# Patient Record
Sex: FEMALE | Race: WHITE | NOT HISPANIC OR LATINO | Employment: PART TIME
[De-identification: names, ages, dates, MRNs, and addresses within clinical notes are randomized per-mention and may not be internally consistent; named-entity substitution may affect disease eponyms.]

---

## 2017-01-24 ENCOUNTER — CHARTING TRANS (OUTPATIENT)
Dept: OTHER | Age: 22
End: 2017-01-24

## 2017-01-24 ENCOUNTER — MYAURORA ACCOUNT LINK (OUTPATIENT)
Dept: OTHER | Age: 22
End: 2017-01-24

## 2017-02-24 ENCOUNTER — LAB SERVICES (OUTPATIENT)
Dept: OTHER | Age: 22
End: 2017-02-24

## 2017-02-24 ENCOUNTER — CHARTING TRANS (OUTPATIENT)
Dept: OTHER | Age: 22
End: 2017-02-24

## 2017-02-24 ENCOUNTER — MYAURORA ACCOUNT LINK (OUTPATIENT)
Dept: OTHER | Age: 22
End: 2017-02-24

## 2017-02-24 LAB — MONOCLONAL PREGNANCY: NORMAL

## 2017-02-28 ENCOUNTER — CHARTING TRANS (OUTPATIENT)
Dept: OTHER | Age: 22
End: 2017-02-28

## 2017-02-28 LAB
APPEARANCE UR: CLEAR
BACTERIA #/AREA URNS HPF: ABNORMAL /HPF
BILIRUB UR QL: NEGATIVE
C TRACH RRNA SPEC QL NAA+PROBE: NEGATIVE
COLOR UR: ABNORMAL
GLUCOSE UR-MCNC: NEGATIVE MG/DL
HYALINE CASTS #/AREA URNS LPF: ABNORMAL /LPF (ref 0–5)
KETONES UR-MCNC: NEGATIVE MG/DL
MUCOUS THREADS URNS QL MICRO: PRESENT
N GONORRHOEA RRNA SPEC QL NAA+PROBE: NEGATIVE
NITRITE UR QL: NEGATIVE
PH UR: 6 UNITS (ref 5–7)
PROT UR QL: NEGATIVE MG/DL
RBC #/AREA URNS HPF: ABNORMAL /HPF (ref 0–3)
RBC-URINE: ABNORMAL
SP GR UR: 1.01 (ref 1–1.03)
SPECIMEN SOURCE: ABNORMAL
SPECIMEN SOURCE: NORMAL
SQUAMOUS #/AREA URNS HPF: ABNORMAL /HPF (ref 0–5)
UROBILINOGEN UR QL: 0.2 MG/DL (ref 0–1)
WBC #/AREA URNS HPF: ABNORMAL /HPF (ref 0–5)
WBC-URINE: NEGATIVE

## 2017-11-30 ENCOUNTER — CHARTING TRANS (OUTPATIENT)
Dept: OTHER | Age: 22
End: 2017-11-30

## 2017-11-30 ENCOUNTER — LAB SERVICES (OUTPATIENT)
Dept: OTHER | Age: 22
End: 2017-11-30

## 2017-11-30 ASSESSMENT — PAIN SCALES - GENERAL: PAINLEVEL_OUTOF10: 0

## 2017-12-01 ENCOUNTER — CHARTING TRANS (OUTPATIENT)
Dept: OTHER | Age: 22
End: 2017-12-01

## 2017-12-01 LAB
ALBUMIN SERPL-MCNC: 3.9 G/DL (ref 3.6–5.1)
ALBUMIN/GLOB SERPL: 1.1 (ref 1–2.4)
ALP SERPL-CCNC: 55 UNITS/L (ref 45–117)
ALT SERPL-CCNC: 17 UNITS/L
ANION GAP SERPL CALC-SCNC: 15 MMOL/L (ref 10–20)
AST SERPL-CCNC: 10 UNITS/L
BASOPHILS # BLD: 0 K/MCL (ref 0–0.3)
BASOPHILS NFR BLD: 0 %
BILIRUB SERPL-MCNC: 0.3 MG/DL (ref 0.2–1)
BUN SERPL-MCNC: 13 MG/DL (ref 6–20)
BUN/CREAT SERPL: 18 (ref 7–25)
CALCIUM SERPL-MCNC: 9.1 MG/DL (ref 8.4–10.2)
CHLORIDE SERPL-SCNC: 106 MMOL/L (ref 98–107)
CO2 SERPL-SCNC: 24 MMOL/L (ref 21–32)
CREAT SERPL-MCNC: 0.71 MG/DL (ref 0.51–0.95)
DIFFERENTIAL METHOD BLD: NORMAL
EOSINOPHIL # BLD: 0.1 K/MCL (ref 0.1–0.5)
EOSINOPHIL NFR BLD: 1 %
ERYTHROCYTE [DISTWIDTH] IN BLOOD: 12.6 % (ref 11–15)
GLOBULIN SER-MCNC: 3.4 G/DL (ref 2–4)
GLUCOSE SERPL-MCNC: 77 MG/DL (ref 65–99)
HDLC SERPL-MCNC: 65 MG/DL
HEMATOCRIT: 43.7 % (ref 36–46.5)
HEMOGLOBIN: 14 G/DL (ref 12–15.5)
LDLC SERPL DIRECT ASSAY-MCNC: 94 MG/DL
LENGTH OF FAST TIME PATIENT: NORMAL HRS
LYMPHOCYTES # BLD: 2.4 K/MCL (ref 1.2–5.2)
LYMPHOCYTES NFR BLD: 29 %
MEAN CORPUSCULAR HEMOGLOBIN: 29.3 PG (ref 26–34)
MEAN CORPUSCULAR HGB CONC: 32 G/DL (ref 32–36.5)
MEAN CORPUSCULAR VOLUME: 91.4 FL (ref 78–100)
MONOCYTES # BLD: 0.6 K/MCL (ref 0.3–0.9)
MONOCYTES NFR BLD: 7 %
NEUTROPHILS # BLD: 5.3 K/MCL (ref 1.8–8)
NEUTROPHILS NFR BLD: 63 %
PLATELET COUNT: 290 K/MCL (ref 140–450)
POTASSIUM SERPL-SCNC: 4.3 MMOL/L (ref 3.4–5.1)
RED CELL COUNT: 4.78 MIL/MCL (ref 4–5.2)
SODIUM SERPL-SCNC: 141 MMOL/L (ref 135–145)
TOTAL PROTEIN: 7.3 G/DL (ref 6.4–8.2)
WHITE BLOOD COUNT: 8.5 K/MCL (ref 4.2–11)

## 2018-02-14 ENCOUNTER — MYAURORA ACCOUNT LINK (OUTPATIENT)
Dept: OTHER | Age: 23
End: 2018-02-14

## 2018-02-14 ENCOUNTER — CHARTING TRANS (OUTPATIENT)
Dept: OTHER | Age: 23
End: 2018-02-14

## 2018-02-14 ASSESSMENT — PAIN SCALES - GENERAL: PAINLEVEL_OUTOF10: 0

## 2018-02-21 ENCOUNTER — CHARTING TRANS (OUTPATIENT)
Dept: OTHER | Age: 23
End: 2018-02-21

## 2018-02-21 ENCOUNTER — MYAURORA ACCOUNT LINK (OUTPATIENT)
Dept: OTHER | Age: 23
End: 2018-02-21

## 2018-02-21 ENCOUNTER — LAB SERVICES (OUTPATIENT)
Dept: OTHER | Age: 23
End: 2018-02-21

## 2018-02-21 LAB — RAPID STREP GROUP A: POSITIVE

## 2018-02-22 ENCOUNTER — CHARTING TRANS (OUTPATIENT)
Dept: OTHER | Age: 23
End: 2018-02-22

## 2018-02-22 LAB
2009 H1N1 SUBTYPE (RF1N1): NOT DETECTED
ADENOVIRUS (RADENO): NOT DETECTED
BOCAVIRUS (RBOCA): NOT DETECTED
C. PNEUMONIAE (RCHLP): NOT DETECTED
CORONAVIRUS 229E (RC229E): NOT DETECTED
CORONAVIRUS HKU1 (RCHKU1): NOT DETECTED
CORONAVIRUS NL63 (RCNL63): NOT DETECTED
CORONAVIRUS OC43 (RCO43): NOT DETECTED
INFLUENZA A SUBTYPE H1 (RFLH1): NOT DETECTED
INFLUENZA A SUBTYPE H3 (RFLH3): NOT DETECTED
INFLUENZA A UNSUBTYPABLE (RIAU): NOT DETECTED
INFLUENZA B VIRUS (RFLUB): NOT DETECTED
M. PNEUMONIAE (RMYPP): NOT DETECTED
METAPNEUMOVIRUS (RMETA): NOT DETECTED
PARAINFLUENZA, TYPE 1 (RPAR1): NOT DETECTED
PARAINFLUENZA, TYPE 2 (RPAR2): NOT DETECTED
PARAINFLUENZA, TYPE 3 (RPAR3): NOT DETECTED
PARAINFLUENZA, TYPE 4 (RPAR4): NOT DETECTED
RHINOVIRUS/ENTEROVIRUS (RRHINO): NOT DETECTED
RSV, SUBTYPE A (RRSVA): NOT DETECTED
RSV, SUBTYPE B (RRSVB): NOT DETECTED
SPECIMEN SOURCE: NORMAL

## 2018-11-01 VITALS
HEIGHT: 64 IN | WEIGHT: 119 LBS | HEART RATE: 111 BPM | BODY MASS INDEX: 20.32 KG/M2 | RESPIRATION RATE: 18 BRPM | DIASTOLIC BLOOD PRESSURE: 72 MMHG | TEMPERATURE: 102.6 F | SYSTOLIC BLOOD PRESSURE: 123 MMHG

## 2018-11-01 VITALS
HEIGHT: 64 IN | SYSTOLIC BLOOD PRESSURE: 120 MMHG | WEIGHT: 118 LBS | DIASTOLIC BLOOD PRESSURE: 80 MMHG | BODY MASS INDEX: 20.14 KG/M2 | RESPIRATION RATE: 16 BRPM

## 2018-11-02 VITALS
HEIGHT: 64 IN | HEART RATE: 90 BPM | BODY MASS INDEX: 20.14 KG/M2 | SYSTOLIC BLOOD PRESSURE: 122 MMHG | DIASTOLIC BLOOD PRESSURE: 80 MMHG | TEMPERATURE: 97.9 F | WEIGHT: 118 LBS | RESPIRATION RATE: 18 BRPM

## 2018-11-05 VITALS
OXYGEN SATURATION: 100 % | HEIGHT: 64 IN | WEIGHT: 131 LBS | DIASTOLIC BLOOD PRESSURE: 76 MMHG | SYSTOLIC BLOOD PRESSURE: 110 MMHG | HEART RATE: 70 BPM | TEMPERATURE: 98 F | BODY MASS INDEX: 22.36 KG/M2 | RESPIRATION RATE: 18 BRPM

## 2018-11-06 VITALS
HEART RATE: 119 BPM | TEMPERATURE: 99.1 F | OXYGEN SATURATION: 97 % | WEIGHT: 130 LBS | SYSTOLIC BLOOD PRESSURE: 106 MMHG | DIASTOLIC BLOOD PRESSURE: 76 MMHG

## 2018-12-21 ENCOUNTER — APPOINTMENT (OUTPATIENT)
Dept: INTERNAL MEDICINE | Age: 23
End: 2018-12-21

## 2018-12-21 ENCOUNTER — TELEPHONE (OUTPATIENT)
Dept: SCHEDULING | Age: 23
End: 2018-12-21

## 2020-01-15 ENCOUNTER — OFFICE VISIT (OUTPATIENT)
Dept: INTERNAL MEDICINE | Age: 25
End: 2020-01-15

## 2020-01-15 VITALS
DIASTOLIC BLOOD PRESSURE: 63 MMHG | SYSTOLIC BLOOD PRESSURE: 107 MMHG | RESPIRATION RATE: 16 BRPM | WEIGHT: 130 LBS | HEIGHT: 66 IN | OXYGEN SATURATION: 97 % | TEMPERATURE: 98.5 F | HEART RATE: 72 BPM | BODY MASS INDEX: 20.89 KG/M2

## 2020-01-15 DIAGNOSIS — F32.9 MAJOR DEPRESSIVE DISORDER WITH SINGLE EPISODE, REMISSION STATUS UNSPECIFIED: Primary | ICD-10-CM

## 2020-01-15 DIAGNOSIS — J30.89 PERENNIAL ALLERGIC RHINITIS: ICD-10-CM

## 2020-01-15 DIAGNOSIS — L60.1 ONYCHOLYSIS DUE TO PSEUDOMONAS INFECTION: ICD-10-CM

## 2020-01-15 DIAGNOSIS — B96.5 ONYCHOLYSIS DUE TO PSEUDOMONAS INFECTION: ICD-10-CM

## 2020-01-15 DIAGNOSIS — Z23 NEED FOR VACCINATION: ICD-10-CM

## 2020-01-15 PROBLEM — N60.19 FIBROCYSTIC BREAST DISEASE: Status: ACTIVE | Noted: 2018-02-14

## 2020-01-15 PROCEDURE — 90471 IMMUNIZATION ADMIN: CPT

## 2020-01-15 PROCEDURE — 90686 IIV4 VACC NO PRSV 0.5 ML IM: CPT

## 2020-01-15 PROCEDURE — 99214 OFFICE O/P EST MOD 30 MIN: CPT | Performed by: INTERNAL MEDICINE

## 2020-01-15 PROCEDURE — 90651 9VHPV VACCINE 2/3 DOSE IM: CPT

## 2020-01-15 PROCEDURE — 90472 IMMUNIZATION ADMIN EACH ADD: CPT

## 2020-01-15 RX ORDER — LEVOFLOXACIN 500 MG/1
500 TABLET, FILM COATED ORAL DAILY
Qty: 10 TABLET | Refills: 0 | Status: SHIPPED | OUTPATIENT
Start: 2020-01-15 | End: 2020-03-14 | Stop reason: ALTCHOICE

## 2020-01-15 SDOH — HEALTH STABILITY: PHYSICAL HEALTH: ON AVERAGE, HOW MANY DAYS PER WEEK DO YOU ENGAGE IN MODERATE TO STRENUOUS EXERCISE (LIKE A BRISK WALK)?: 0 DAYS

## 2020-01-15 SDOH — HEALTH STABILITY: PHYSICAL HEALTH: ON AVERAGE, HOW MANY MINUTES DO YOU ENGAGE IN EXERCISE AT THIS LEVEL?: 0 MIN

## 2020-01-15 ASSESSMENT — PATIENT HEALTH QUESTIONNAIRE - PHQ9
2. FEELING DOWN, DEPRESSED OR HOPELESS: NOT AT ALL
1. LITTLE INTEREST OR PLEASURE IN DOING THINGS: NOT AT ALL
SUM OF ALL RESPONSES TO PHQ9 QUESTIONS 1 AND 2: 0
SUM OF ALL RESPONSES TO PHQ9 QUESTIONS 1 AND 2: 0

## 2020-01-16 ASSESSMENT — ENCOUNTER SYMPTOMS
UNEXPECTED WEIGHT CHANGE: 1
GASTROINTESTINAL NEGATIVE: 1
COUGH: 1
NEUROLOGICAL NEGATIVE: 1
COLOR CHANGE: 1
RHINORRHEA: 1
EYES NEGATIVE: 1

## 2020-01-28 ENCOUNTER — TELEPHONE (OUTPATIENT)
Dept: CARDIOLOGY | Age: 25
End: 2020-01-28

## 2020-03-13 ENCOUNTER — TELEPHONE (OUTPATIENT)
Dept: SCHEDULING | Age: 25
End: 2020-03-13

## 2020-03-13 ASSESSMENT — LIFESTYLE VARIABLES
SMOKING_STATUS: NO
EVER_SMOKED: I HAVE NEVER SMOKED

## 2020-03-14 ENCOUNTER — TELEPHONE (OUTPATIENT)
Dept: SCHEDULING | Age: 25
End: 2020-03-14

## 2020-03-14 ENCOUNTER — WALK IN (OUTPATIENT)
Dept: URGENT CARE | Age: 25
End: 2020-03-14

## 2020-03-14 ENCOUNTER — E-ADVICE (OUTPATIENT)
Dept: INTERNAL MEDICINE | Age: 25
End: 2020-03-14

## 2020-03-14 VITALS
OXYGEN SATURATION: 97 % | SYSTOLIC BLOOD PRESSURE: 128 MMHG | BODY MASS INDEX: 20.98 KG/M2 | HEIGHT: 66 IN | HEART RATE: 67 BPM | TEMPERATURE: 98.1 F | DIASTOLIC BLOOD PRESSURE: 77 MMHG | RESPIRATION RATE: 18 BRPM

## 2020-03-14 DIAGNOSIS — J06.9 VIRAL URI WITH COUGH: ICD-10-CM

## 2020-03-14 DIAGNOSIS — J02.0 ACUTE STREPTOCOCCAL PHARYNGITIS: Primary | ICD-10-CM

## 2020-03-14 LAB
INTERNAL PROCEDURAL CONTROLS ACCEPTABLE: YES
S PYO AG THROAT QL IA.RAPID: POSITIVE

## 2020-03-14 PROCEDURE — 87880 STREP A ASSAY W/OPTIC: CPT | Performed by: PHYSICIAN ASSISTANT

## 2020-03-14 PROCEDURE — 99203 OFFICE O/P NEW LOW 30 MIN: CPT | Performed by: PHYSICIAN ASSISTANT

## 2020-03-14 RX ORDER — BENZONATATE 200 MG/1
200 CAPSULE ORAL 3 TIMES DAILY PRN
Qty: 30 CAPSULE | Refills: 0 | Status: SHIPPED | OUTPATIENT
Start: 2020-03-14 | End: 2020-12-12 | Stop reason: ALTCHOICE

## 2020-03-14 RX ORDER — DEXTROMETHORPHAN HYDROBROMIDE AND PROMETHAZINE HYDROCHLORIDE 15; 6.25 MG/5ML; MG/5ML
SYRUP ORAL
Qty: 118 ML | Refills: 0 | Status: SHIPPED | OUTPATIENT
Start: 2020-03-14 | End: 2020-12-12 | Stop reason: ALTCHOICE

## 2020-03-14 RX ORDER — AMOXICILLIN 875 MG/1
875 TABLET, COATED ORAL 2 TIMES DAILY
Qty: 20 TABLET | Refills: 0 | Status: SHIPPED | OUTPATIENT
Start: 2020-03-14 | End: 2020-03-24

## 2020-03-17 ENCOUNTER — TELEPHONE (OUTPATIENT)
Dept: SCHEDULING | Age: 25
End: 2020-03-17

## 2020-03-20 ASSESSMENT — ENCOUNTER SYMPTOMS
RHINORRHEA: 1
HEADACHES: 0
SORE THROAT: 1
CHILLS: 0
SWOLLEN GLANDS: 0
FEVER: 0
DIARRHEA: 0
SINUS PAIN: 0
TROUBLE SWALLOWING: 0
NAUSEA: 0
WHEEZING: 0
DIZZINESS: 0
VOMITING: 0
SINUS PRESSURE: 0
ACTIVITY CHANGE: 0
ABDOMINAL PAIN: 0
COUGH: 1
EYE DISCHARGE: 0
EYE PAIN: 0
DIAPHORESIS: 0
FATIGUE: 1
SHORTNESS OF BREATH: 0
EYE REDNESS: 0

## 2020-09-03 ENCOUNTER — TELEPHONE (OUTPATIENT)
Dept: SCHEDULING | Age: 25
End: 2020-09-03

## 2020-09-09 ENCOUNTER — TELEPHONE (OUTPATIENT)
Dept: SCHEDULING | Age: 25
End: 2020-09-09

## 2020-09-14 ENCOUNTER — OFFICE VISIT (OUTPATIENT)
Dept: INTERNAL MEDICINE | Age: 25
End: 2020-09-14

## 2020-09-14 ENCOUNTER — LAB SERVICES (OUTPATIENT)
Dept: LAB | Age: 25
End: 2020-09-14

## 2020-09-14 VITALS
HEIGHT: 66 IN | DIASTOLIC BLOOD PRESSURE: 74 MMHG | SYSTOLIC BLOOD PRESSURE: 104 MMHG | RESPIRATION RATE: 16 BRPM | WEIGHT: 134.48 LBS | HEART RATE: 79 BPM | TEMPERATURE: 97.6 F | BODY MASS INDEX: 21.61 KG/M2

## 2020-09-14 DIAGNOSIS — L29.9 PRURITUS: Primary | ICD-10-CM

## 2020-09-14 DIAGNOSIS — F32.9 MAJOR DEPRESSIVE DISORDER WITH SINGLE EPISODE, REMISSION STATUS UNSPECIFIED: ICD-10-CM

## 2020-09-14 DIAGNOSIS — Z12.4 CERVICAL CANCER SCREENING: ICD-10-CM

## 2020-09-14 DIAGNOSIS — Z00.00 HEALTHCARE MAINTENANCE: ICD-10-CM

## 2020-09-14 DIAGNOSIS — J30.89 PERENNIAL ALLERGIC RHINITIS: ICD-10-CM

## 2020-09-14 DIAGNOSIS — H52.7 REFRACTION ERROR: ICD-10-CM

## 2020-09-14 PROBLEM — B96.5 ONYCHOLYSIS DUE TO PSEUDOMONAS INFECTION: Status: RESOLVED | Noted: 2020-01-15 | Resolved: 2020-09-14

## 2020-09-14 PROBLEM — L60.1 ONYCHOLYSIS DUE TO PSEUDOMONAS INFECTION: Status: RESOLVED | Noted: 2020-01-15 | Resolved: 2020-09-14

## 2020-09-14 LAB
ALBUMIN SERPL-MCNC: 3.9 G/DL (ref 3.6–5.1)
ALBUMIN/GLOB SERPL: 1.2 {RATIO} (ref 1–2.4)
ALP SERPL-CCNC: 49 UNITS/L (ref 45–117)
ALT SERPL-CCNC: 21 UNITS/L
ANION GAP SERPL CALC-SCNC: 8 MMOL/L (ref 10–20)
AST SERPL-CCNC: 13 UNITS/L
BASOPHILS # BLD: 0.1 K/MCL (ref 0–0.3)
BASOPHILS NFR BLD: 1 %
BILIRUB SERPL-MCNC: 0.6 MG/DL (ref 0.2–1)
BUN SERPL-MCNC: 9 MG/DL (ref 6–20)
BUN/CREAT SERPL: 12 (ref 7–25)
CALCIUM SERPL-MCNC: 9.2 MG/DL (ref 8.4–10.2)
CHLORIDE SERPL-SCNC: 105 MMOL/L (ref 98–107)
CO2 SERPL-SCNC: 29 MMOL/L (ref 21–32)
CREAT SERPL-MCNC: 0.78 MG/DL (ref 0.51–0.95)
DIFFERENTIAL METHOD BLD: ABNORMAL
EOSINOPHIL # BLD: 0.2 K/MCL (ref 0.1–0.5)
EOSINOPHIL NFR BLD: 3 %
ERYTHROCYTE [DISTWIDTH] IN BLOOD: 13 % (ref 11–15)
GLOBULIN SER-MCNC: 3.2 G/DL (ref 2–4)
GLUCOSE SERPL-MCNC: 88 MG/DL (ref 65–99)
HCT VFR BLD CALC: 42.2 % (ref 36–46.5)
HGB BLD-MCNC: 13.4 G/DL (ref 12–15.5)
IMM GRANULOCYTES # BLD AUTO: 0 K/MCL (ref 0–0.2)
IMM GRANULOCYTES NFR BLD: 0 %
LDLC SERPL DIRECT ASSAY-MCNC: 101 MG/DL
LENGTH OF FAST TIME PATIENT: ABNORMAL HRS
LYMPHOCYTES # BLD: 1.6 K/MCL (ref 1–4.8)
LYMPHOCYTES NFR BLD: 32 %
MCH RBC QN AUTO: 28.6 PG (ref 26–34)
MCHC RBC AUTO-ENTMCNC: 31.8 G/DL (ref 32–36.5)
MCV RBC AUTO: 90 FL (ref 78–100)
MONOCYTES # BLD: 0.5 K/MCL (ref 0.3–0.9)
MONOCYTES NFR BLD: 10 %
NEUTROPHILS # BLD: 2.7 K/MCL (ref 1.8–7.7)
NEUTROPHILS NFR BLD: 54 %
NRBC BLD MANUAL-RTO: 0 /100 WBC
PLATELET # BLD: 245 K/MCL (ref 140–450)
POTASSIUM SERPL-SCNC: 4.3 MMOL/L (ref 3.4–5.1)
PROT SERPL-MCNC: 7.1 G/DL (ref 6.4–8.2)
RBC # BLD: 4.69 MIL/MCL (ref 4–5.2)
SODIUM SERPL-SCNC: 138 MMOL/L (ref 135–145)
WBC # BLD: 5 K/MCL (ref 4.2–11)

## 2020-09-14 PROCEDURE — 85025 COMPLETE CBC W/AUTO DIFF WBC: CPT | Performed by: INTERNAL MEDICINE

## 2020-09-14 PROCEDURE — 36415 COLL VENOUS BLD VENIPUNCTURE: CPT

## 2020-09-14 PROCEDURE — 80053 COMPREHEN METABOLIC PANEL: CPT | Performed by: INTERNAL MEDICINE

## 2020-09-14 PROCEDURE — 83721 ASSAY OF BLOOD LIPOPROTEIN: CPT | Performed by: INTERNAL MEDICINE

## 2020-09-14 PROCEDURE — 99214 OFFICE O/P EST MOD 30 MIN: CPT | Performed by: INTERNAL MEDICINE

## 2020-09-14 RX ORDER — TRIAMCINOLONE ACETONIDE 1 MG/G
OINTMENT TOPICAL 2 TIMES DAILY
Qty: 80 G | Refills: 1 | Status: SHIPPED | OUTPATIENT
Start: 2020-09-14 | End: 2023-02-11 | Stop reason: ALTCHOICE

## 2020-09-14 SDOH — HEALTH STABILITY: PHYSICAL HEALTH: ON AVERAGE, HOW MANY MINUTES DO YOU ENGAGE IN EXERCISE AT THIS LEVEL?: 0 MIN

## 2020-09-14 SDOH — HEALTH STABILITY: PHYSICAL HEALTH: ON AVERAGE, HOW MANY DAYS PER WEEK DO YOU ENGAGE IN MODERATE TO STRENUOUS EXERCISE (LIKE A BRISK WALK)?: 0 DAYS

## 2020-09-14 ASSESSMENT — PATIENT HEALTH QUESTIONNAIRE - PHQ9
SUM OF ALL RESPONSES TO PHQ9 QUESTIONS 1 AND 2: 2
2. FEELING DOWN, DEPRESSED OR HOPELESS: SEVERAL DAYS
SUM OF ALL RESPONSES TO PHQ9 QUESTIONS 1 AND 2: 2
1. LITTLE INTEREST OR PLEASURE IN DOING THINGS: SEVERAL DAYS
CLINICAL INTERPRETATION OF PHQ9 SCORE: NO FURTHER SCREENING NEEDED
CLINICAL INTERPRETATION OF PHQ2 SCORE: NO FURTHER SCREENING NEEDED

## 2020-09-15 ASSESSMENT — ENCOUNTER SYMPTOMS
CONSTITUTIONAL NEGATIVE: 1
NEUROLOGICAL NEGATIVE: 1
PSYCHIATRIC NEGATIVE: 1
RESPIRATORY NEGATIVE: 1

## 2020-09-30 ENCOUNTER — TELEPHONE (OUTPATIENT)
Dept: SCHEDULING | Age: 25
End: 2020-09-30

## 2020-12-11 ENCOUNTER — TELEPHONE (OUTPATIENT)
Dept: SCHEDULING | Age: 25
End: 2020-12-11

## 2020-12-12 ENCOUNTER — WALK IN (OUTPATIENT)
Dept: URGENT CARE | Age: 25
End: 2020-12-12

## 2020-12-12 VITALS
TEMPERATURE: 98.5 F | OXYGEN SATURATION: 96 % | RESPIRATION RATE: 18 BRPM | HEART RATE: 105 BPM | SYSTOLIC BLOOD PRESSURE: 125 MMHG | DIASTOLIC BLOOD PRESSURE: 85 MMHG

## 2020-12-12 DIAGNOSIS — Z20.822 EXPOSURE TO COVID-19 VIRUS: Primary | ICD-10-CM

## 2020-12-12 DIAGNOSIS — B34.9 VIRAL SYNDROME: ICD-10-CM

## 2020-12-12 LAB — SARS-COV-2 AG RESP QL IA.RAPID: NOT DETECTED

## 2020-12-12 PROCEDURE — 87426 SARSCOV CORONAVIRUS AG IA: CPT | Performed by: PHYSICIAN ASSISTANT

## 2020-12-12 PROCEDURE — U0003 INFECTIOUS AGENT DETECTION BY NUCLEIC ACID (DNA OR RNA); SEVERE ACUTE RESPIRATORY SYNDROME CORONAVIRUS 2 (SARS-COV-2) (CORONAVIRUS DISEASE [COVID-19]), AMPLIFIED PROBE TECHNIQUE, MAKING USE OF HIGH THROUGHPUT TECHNOLOGIES AS DESCRIBED BY CMS-2020-01-R: HCPCS | Performed by: PHYSICIAN ASSISTANT

## 2020-12-12 PROCEDURE — 99214 OFFICE O/P EST MOD 30 MIN: CPT | Performed by: PHYSICIAN ASSISTANT

## 2020-12-12 RX ORDER — ALBUTEROL SULFATE 90 UG/1
2 AEROSOL, METERED RESPIRATORY (INHALATION) EVERY 4 HOURS PRN
Qty: 1 INHALER | Refills: 0 | Status: SHIPPED | OUTPATIENT
Start: 2020-12-12 | End: 2023-02-11 | Stop reason: SDUPTHER

## 2020-12-12 ASSESSMENT — ENCOUNTER SYMPTOMS
DIZZINESS: 0
HEADACHES: 0
CHEST TIGHTNESS: 1
RHINORRHEA: 0
DIAPHORESIS: 0
SINUS PAIN: 0
NAUSEA: 1
SHORTNESS OF BREATH: 1
CHILLS: 1
COUGH: 1
WHEEZING: 0
SORE THROAT: 0
DIARRHEA: 0
ACTIVITY CHANGE: 0
FEVER: 0
VOMITING: 0
FATIGUE: 0

## 2020-12-14 LAB
SARS-COV-2 RNA RESP QL NAA+PROBE: NOT DETECTED
SERVICE CMNT-IMP: NORMAL
SERVICE CMNT-IMP: NORMAL

## 2021-01-01 ENCOUNTER — EXTERNAL RECORD (OUTPATIENT)
Dept: HEALTH INFORMATION MANAGEMENT | Facility: OTHER | Age: 26
End: 2021-01-01

## 2021-05-18 ENCOUNTER — TELEPHONE (OUTPATIENT)
Dept: SCHEDULING | Age: 26
End: 2021-05-18

## 2021-05-18 DIAGNOSIS — J32.9 CHRONIC SINUSITIS, UNSPECIFIED LOCATION: Primary | ICD-10-CM

## 2021-07-22 ENCOUNTER — TELEPHONE (OUTPATIENT)
Dept: SCHEDULING | Age: 26
End: 2021-07-22

## 2021-07-22 ENCOUNTER — OFFICE VISIT (OUTPATIENT)
Dept: URGENT CARE | Age: 26
End: 2021-07-22

## 2021-07-22 VITALS
HEART RATE: 112 BPM | TEMPERATURE: 98.5 F | SYSTOLIC BLOOD PRESSURE: 106 MMHG | BODY MASS INDEX: 22.62 KG/M2 | HEIGHT: 64 IN | DIASTOLIC BLOOD PRESSURE: 64 MMHG | WEIGHT: 132.5 LBS | OXYGEN SATURATION: 99 % | RESPIRATION RATE: 16 BRPM

## 2021-07-22 DIAGNOSIS — J02.0 STREP PHARYNGITIS: ICD-10-CM

## 2021-07-22 DIAGNOSIS — R05.9 COUGH: Primary | ICD-10-CM

## 2021-07-22 LAB
INTERNAL PROCEDURAL CONTROLS ACCEPTABLE: YES
S PYO AG THROAT QL IA.RAPID: POSITIVE

## 2021-07-22 PROCEDURE — U0005 INFEC AGEN DETEC AMPLI PROBE: HCPCS | Performed by: NURSE PRACTITIONER

## 2021-07-22 PROCEDURE — 99213 OFFICE O/P EST LOW 20 MIN: CPT | Performed by: NURSE PRACTITIONER

## 2021-07-22 PROCEDURE — 87880 STREP A ASSAY W/OPTIC: CPT | Performed by: NURSE PRACTITIONER

## 2021-07-22 PROCEDURE — U0003 INFECTIOUS AGENT DETECTION BY NUCLEIC ACID (DNA OR RNA); SEVERE ACUTE RESPIRATORY SYNDROME CORONAVIRUS 2 (SARS-COV-2) (CORONAVIRUS DISEASE [COVID-19]), AMPLIFIED PROBE TECHNIQUE, MAKING USE OF HIGH THROUGHPUT TECHNOLOGIES AS DESCRIBED BY CMS-2020-01-R: HCPCS | Performed by: NURSE PRACTITIONER

## 2021-07-22 RX ORDER — ALBUTEROL SULFATE 90 UG/1
AEROSOL, METERED RESPIRATORY (INHALATION)
Qty: 1 EACH | Refills: 0 | Status: SHIPPED | OUTPATIENT
Start: 2021-07-22 | End: 2023-02-11 | Stop reason: SDUPTHER

## 2021-07-22 RX ORDER — BENZONATATE 100 MG/1
CAPSULE ORAL
Qty: 24 CAPSULE | Refills: 0 | Status: SHIPPED | OUTPATIENT
Start: 2021-07-22 | End: 2023-02-11 | Stop reason: ALTCHOICE

## 2021-07-22 RX ORDER — AMOXICILLIN 500 MG/1
500 CAPSULE ORAL EVERY 12 HOURS
Qty: 20 CAPSULE | Refills: 0 | Status: SHIPPED | OUTPATIENT
Start: 2021-07-22 | End: 2021-08-01

## 2021-07-22 ASSESSMENT — ENCOUNTER SYMPTOMS
GASTROINTESTINAL NEGATIVE: 1
TROUBLE SWALLOWING: 0
APPETITE CHANGE: 0

## 2021-07-23 LAB
SARS-COV-2 RNA RESP QL NAA+PROBE: NOT DETECTED
SERVICE CMNT-IMP: NORMAL
SERVICE CMNT-IMP: NORMAL

## 2021-09-21 ENCOUNTER — HOSPITAL ENCOUNTER (OUTPATIENT)
Dept: CT IMAGING | Age: 26
Discharge: HOME | End: 2021-09-21
Attending: INTERNAL MEDICINE

## 2021-09-21 DIAGNOSIS — J32.9 CHRONIC SINUSITIS, UNSPECIFIED LOCATION: ICD-10-CM

## 2021-09-21 PROCEDURE — 70486 CT MAXILLOFACIAL W/O DYE: CPT

## 2021-11-06 ENCOUNTER — LAB SERVICES (OUTPATIENT)
Dept: URGENT CARE | Age: 26
End: 2021-11-06

## 2021-11-06 DIAGNOSIS — Z01.812 ENCOUNTER FOR SCREENING LABORATORY TESTING FOR COVID-19 VIRUS IN ASYMPTOMATIC PATIENT: Primary | ICD-10-CM

## 2021-11-06 DIAGNOSIS — Z11.52 ENCOUNTER FOR SCREENING LABORATORY TESTING FOR COVID-19 VIRUS IN ASYMPTOMATIC PATIENT: Primary | ICD-10-CM

## 2021-11-06 PROCEDURE — U0003 INFECTIOUS AGENT DETECTION BY NUCLEIC ACID (DNA OR RNA); SEVERE ACUTE RESPIRATORY SYNDROME CORONAVIRUS 2 (SARS-COV-2) (CORONAVIRUS DISEASE [COVID-19]), AMPLIFIED PROBE TECHNIQUE, MAKING USE OF HIGH THROUGHPUT TECHNOLOGIES AS DESCRIBED BY CMS-2020-01-R: HCPCS | Performed by: PSYCHIATRY & NEUROLOGY

## 2021-11-06 PROCEDURE — U0005 INFEC AGEN DETEC AMPLI PROBE: HCPCS | Performed by: PSYCHIATRY & NEUROLOGY

## 2021-11-07 LAB
SARS-COV-2 RNA RESP QL NAA+PROBE: NOT DETECTED
SERVICE CMNT-IMP: NORMAL
SERVICE CMNT-IMP: NORMAL

## 2021-12-15 ENCOUNTER — LAB SERVICES (OUTPATIENT)
Dept: URGENT CARE | Age: 26
End: 2021-12-15

## 2021-12-15 DIAGNOSIS — Z11.52 ENCOUNTER FOR SCREENING LABORATORY TESTING FOR COVID-19 VIRUS IN ASYMPTOMATIC PATIENT: ICD-10-CM

## 2021-12-15 DIAGNOSIS — Z01.812 ENCOUNTER FOR SCREENING LABORATORY TESTING FOR COVID-19 VIRUS IN ASYMPTOMATIC PATIENT: ICD-10-CM

## 2021-12-15 PROCEDURE — U0005 INFEC AGEN DETEC AMPLI PROBE: HCPCS | Performed by: PSYCHIATRY & NEUROLOGY

## 2021-12-15 PROCEDURE — U0003 INFECTIOUS AGENT DETECTION BY NUCLEIC ACID (DNA OR RNA); SEVERE ACUTE RESPIRATORY SYNDROME CORONAVIRUS 2 (SARS-COV-2) (CORONAVIRUS DISEASE [COVID-19]), AMPLIFIED PROBE TECHNIQUE, MAKING USE OF HIGH THROUGHPUT TECHNOLOGIES AS DESCRIBED BY CMS-2020-01-R: HCPCS | Performed by: PSYCHIATRY & NEUROLOGY

## 2021-12-16 LAB
SARS-COV-2 RNA RESP QL NAA+PROBE: NOT DETECTED
SERVICE CMNT-IMP: NORMAL
SERVICE CMNT-IMP: NORMAL

## 2021-12-18 ENCOUNTER — APPOINTMENT (OUTPATIENT)
Dept: GENERAL RADIOLOGY | Age: 26
End: 2021-12-18
Attending: EMERGENCY MEDICINE

## 2021-12-18 ENCOUNTER — HOSPITAL ENCOUNTER (EMERGENCY)
Age: 26
Discharge: HOME | End: 2021-12-18
Attending: EMERGENCY MEDICINE

## 2021-12-18 ENCOUNTER — NURSE TRIAGE (OUTPATIENT)
Dept: SCHEDULING | Age: 26
End: 2021-12-18

## 2021-12-18 VITALS
RESPIRATION RATE: 18 BRPM | HEART RATE: 83 BPM | BODY MASS INDEX: 23.84 KG/M2 | TEMPERATURE: 99.1 F | WEIGHT: 138.89 LBS | OXYGEN SATURATION: 98 % | SYSTOLIC BLOOD PRESSURE: 120 MMHG | DIASTOLIC BLOOD PRESSURE: 88 MMHG

## 2021-12-18 DIAGNOSIS — J02.9 PHARYNGITIS, UNSPECIFIED ETIOLOGY: Primary | ICD-10-CM

## 2021-12-18 LAB — S PYO DNA THROAT QL NAA+PROBE: NOT DETECTED

## 2021-12-18 PROCEDURE — 93005 ELECTROCARDIOGRAM TRACING: CPT | Performed by: EMERGENCY MEDICINE

## 2021-12-18 PROCEDURE — 99282 EMERGENCY DEPT VISIT SF MDM: CPT | Performed by: EMERGENCY MEDICINE

## 2021-12-18 PROCEDURE — 99284 EMERGENCY DEPT VISIT MOD MDM: CPT

## 2021-12-18 PROCEDURE — 10002800 HB RX 250 W HCPCS: Performed by: EMERGENCY MEDICINE

## 2021-12-18 PROCEDURE — U0003 INFECTIOUS AGENT DETECTION BY NUCLEIC ACID (DNA OR RNA); SEVERE ACUTE RESPIRATORY SYNDROME CORONAVIRUS 2 (SARS-COV-2) (CORONAVIRUS DISEASE [COVID-19]), AMPLIFIED PROBE TECHNIQUE, MAKING USE OF HIGH THROUGHPUT TECHNOLOGIES AS DESCRIBED BY CMS-2020-01-R: HCPCS | Performed by: EMERGENCY MEDICINE

## 2021-12-18 PROCEDURE — 93010 ELECTROCARDIOGRAM REPORT: CPT | Performed by: INTERNAL MEDICINE

## 2021-12-18 PROCEDURE — 87651 STREP A DNA AMP PROBE: CPT | Performed by: EMERGENCY MEDICINE

## 2021-12-18 PROCEDURE — 71046 X-RAY EXAM CHEST 2 VIEWS: CPT

## 2021-12-18 PROCEDURE — C9803 HOPD COVID-19 SPEC COLLECT: HCPCS

## 2021-12-18 RX ORDER — DEXAMETHASONE SODIUM PHOSPHATE 4 MG/ML
10 INJECTION, SOLUTION INTRA-ARTICULAR; INTRALESIONAL; INTRAMUSCULAR; INTRAVENOUS; SOFT TISSUE ONCE
Status: COMPLETED | OUTPATIENT
Start: 2021-12-18 | End: 2021-12-18

## 2021-12-18 RX ADMIN — DEXAMETHASONE SODIUM PHOSPHATE 10 MG: 4 INJECTION, SOLUTION INTRAMUSCULAR; INTRAVENOUS at 19:44

## 2021-12-18 ASSESSMENT — PAIN DESCRIPTION - PAIN TYPE: TYPE: ACUTE PAIN

## 2021-12-18 ASSESSMENT — PAIN SCALES - GENERAL: PAINLEVEL_OUTOF10: 4

## 2021-12-19 LAB
ATRIAL RATE (BPM): 71
P AXIS (DEGREES): 43
PR-INTERVAL (MSEC): 125
QRS-INTERVAL (MSEC): 91
QT-INTERVAL (MSEC): 381
QTC: 414
R AXIS (DEGREES): 63
REPORT TEXT: NORMAL
SARS-COV-2 RNA RESP QL NAA+PROBE: NOT DETECTED
SERVICE CMNT-IMP: NORMAL
SERVICE CMNT-IMP: NORMAL
T AXIS (DEGREES): 39
VENTRICULAR RATE EKG/MIN (BPM): 71

## 2021-12-27 ENCOUNTER — LAB SERVICES (OUTPATIENT)
Dept: URGENT CARE | Age: 26
End: 2021-12-27

## 2021-12-27 DIAGNOSIS — Z11.52 ENCOUNTER FOR SCREENING LABORATORY TESTING FOR COVID-19 VIRUS IN ASYMPTOMATIC PATIENT: ICD-10-CM

## 2021-12-27 DIAGNOSIS — Z01.812 ENCOUNTER FOR SCREENING LABORATORY TESTING FOR COVID-19 VIRUS IN ASYMPTOMATIC PATIENT: ICD-10-CM

## 2021-12-27 PROCEDURE — U0005 INFEC AGEN DETEC AMPLI PROBE: HCPCS | Performed by: PSYCHIATRY & NEUROLOGY

## 2021-12-27 PROCEDURE — U0003 INFECTIOUS AGENT DETECTION BY NUCLEIC ACID (DNA OR RNA); SEVERE ACUTE RESPIRATORY SYNDROME CORONAVIRUS 2 (SARS-COV-2) (CORONAVIRUS DISEASE [COVID-19]), AMPLIFIED PROBE TECHNIQUE, MAKING USE OF HIGH THROUGHPUT TECHNOLOGIES AS DESCRIBED BY CMS-2020-01-R: HCPCS | Performed by: PSYCHIATRY & NEUROLOGY

## 2021-12-28 LAB
SARS-COV-2 RNA RESP QL NAA+PROBE: DETECTED
SERVICE CMNT-IMP: ABNORMAL

## 2021-12-29 ENCOUNTER — E-ADVICE (OUTPATIENT)
Dept: INTERNAL MEDICINE | Age: 26
End: 2021-12-29

## 2022-03-31 ENCOUNTER — TELEPHONE (OUTPATIENT)
Dept: SCHEDULING | Age: 27
End: 2022-03-31

## 2022-04-11 ENCOUNTER — LAB SERVICES (OUTPATIENT)
Dept: LAB | Age: 27
End: 2022-04-11

## 2022-04-11 ENCOUNTER — OFFICE VISIT (OUTPATIENT)
Dept: INTERNAL MEDICINE | Age: 27
End: 2022-04-11

## 2022-04-11 VITALS
SYSTOLIC BLOOD PRESSURE: 120 MMHG | HEART RATE: 89 BPM | HEIGHT: 64 IN | DIASTOLIC BLOOD PRESSURE: 80 MMHG | TEMPERATURE: 98.5 F | WEIGHT: 142.86 LBS | BODY MASS INDEX: 24.39 KG/M2

## 2022-04-11 DIAGNOSIS — L70.0 BLACKHEAD: ICD-10-CM

## 2022-04-11 DIAGNOSIS — Z00.00 ENCOUNTER FOR PREVENTIVE HEALTH EXAMINATION: ICD-10-CM

## 2022-04-11 DIAGNOSIS — Z00.00 ENCOUNTER FOR PREVENTIVE HEALTH EXAMINATION: Primary | ICD-10-CM

## 2022-04-11 DIAGNOSIS — L98.9 SKIN LESION OF LEFT ARM: ICD-10-CM

## 2022-04-11 DIAGNOSIS — J30.89 PERENNIAL ALLERGIC RHINITIS: ICD-10-CM

## 2022-04-11 DIAGNOSIS — H52.7 REFRACTION ERROR: ICD-10-CM

## 2022-04-11 DIAGNOSIS — J45.990 EXERCISE-INDUCED BRONCHOSPASM: ICD-10-CM

## 2022-04-11 DIAGNOSIS — Z12.4 SCREENING FOR CERVICAL CANCER: ICD-10-CM

## 2022-04-11 DIAGNOSIS — K21.9 LARYNGOPHARYNGEAL REFLUX: ICD-10-CM

## 2022-04-11 PROBLEM — J34.3 HYPERTROPHY OF NASAL TURBINATES: Status: ACTIVE | Noted: 2022-04-11

## 2022-04-11 PROBLEM — J34.2 DEVIATED NASAL SEPTUM: Status: ACTIVE | Noted: 2022-04-11

## 2022-04-11 PROCEDURE — 80053 COMPREHEN METABOLIC PANEL: CPT | Performed by: INTERNAL MEDICINE

## 2022-04-11 PROCEDURE — 86787 VARICELLA-ZOSTER ANTIBODY: CPT | Performed by: INTERNAL MEDICINE

## 2022-04-11 PROCEDURE — 36415 COLL VENOUS BLD VENIPUNCTURE: CPT | Performed by: INTERNAL MEDICINE

## 2022-04-11 PROCEDURE — 99395 PREV VISIT EST AGE 18-39: CPT | Performed by: INTERNAL MEDICINE

## 2022-04-11 PROCEDURE — 85025 COMPLETE CBC W/AUTO DIFF WBC: CPT | Performed by: INTERNAL MEDICINE

## 2022-04-11 PROCEDURE — 83721 ASSAY OF BLOOD LIPOPROTEIN: CPT | Performed by: INTERNAL MEDICINE

## 2022-04-11 PROCEDURE — 83718 ASSAY OF LIPOPROTEIN: CPT | Performed by: INTERNAL MEDICINE

## 2022-04-11 RX ORDER — FLUTICASONE PROPIONATE 50 MCG
SPRAY, SUSPENSION (ML) NASAL EVERY 12 HOURS
COMMUNITY
Start: 2021-12-02 | End: 2023-02-11

## 2022-04-11 RX ORDER — AZELASTINE 1 MG/ML
SPRAY, METERED NASAL
COMMUNITY
Start: 2022-03-03 | End: 2023-02-11

## 2022-04-11 ASSESSMENT — PATIENT HEALTH QUESTIONNAIRE - PHQ9
SUM OF ALL RESPONSES TO PHQ9 QUESTIONS 1 AND 2: 0
SUM OF ALL RESPONSES TO PHQ9 QUESTIONS 1 AND 2: 0
2. FEELING DOWN, DEPRESSED OR HOPELESS: NOT AT ALL
CLINICAL INTERPRETATION OF PHQ2 SCORE: NO FURTHER SCREENING NEEDED
1. LITTLE INTEREST OR PLEASURE IN DOING THINGS: NOT AT ALL

## 2022-04-12 LAB
ALBUMIN SERPL-MCNC: 3.9 G/DL (ref 3.6–5.1)
ALBUMIN/GLOB SERPL: 1.2 {RATIO} (ref 1–2.4)
ALP SERPL-CCNC: 62 UNITS/L (ref 45–117)
ALT SERPL-CCNC: 20 UNITS/L
ANION GAP SERPL CALC-SCNC: 8 MMOL/L (ref 10–20)
AST SERPL-CCNC: 11 UNITS/L
BASOPHILS # BLD: 0.1 K/MCL (ref 0–0.3)
BASOPHILS NFR BLD: 1 %
BILIRUB SERPL-MCNC: 0.2 MG/DL (ref 0.2–1)
BUN SERPL-MCNC: 12 MG/DL (ref 6–20)
BUN/CREAT SERPL: 15 (ref 7–25)
CALCIUM SERPL-MCNC: 9.1 MG/DL (ref 8.4–10.2)
CHLORIDE SERPL-SCNC: 109 MMOL/L (ref 98–107)
CO2 SERPL-SCNC: 27 MMOL/L (ref 21–32)
CREAT SERPL-MCNC: 0.79 MG/DL (ref 0.51–0.95)
DEPRECATED RDW RBC: 42.2 FL (ref 39–50)
EOSINOPHIL # BLD: 0.2 K/MCL (ref 0–0.5)
EOSINOPHIL NFR BLD: 3 %
ERYTHROCYTE [DISTWIDTH] IN BLOOD: 12.7 % (ref 11–15)
FASTING DURATION TIME PATIENT: ABNORMAL H
FASTING DURATION TIME PATIENT: NORMAL H
FASTING DURATION TIME PATIENT: NORMAL H
GFR SERPLBLD BASED ON 1.73 SQ M-ARVRAT: >90 ML/MIN
GLOBULIN SER-MCNC: 3.3 G/DL (ref 2–4)
GLUCOSE SERPL-MCNC: 73 MG/DL (ref 70–99)
HCT VFR BLD CALC: 43.2 % (ref 36–46.5)
HDLC SERPL-MCNC: 68 MG/DL
HGB BLD-MCNC: 13.9 G/DL (ref 12–15.5)
IMM GRANULOCYTES # BLD AUTO: 0 K/MCL (ref 0–0.2)
IMM GRANULOCYTES # BLD: 0 %
LDLC SERPL DIRECT ASSAY-MCNC: 99 MG/DL
LYMPHOCYTES # BLD: 2.1 K/MCL (ref 1–4.8)
LYMPHOCYTES NFR BLD: 29 %
MCH RBC QN AUTO: 29.2 PG (ref 26–34)
MCHC RBC AUTO-ENTMCNC: 32.2 G/DL (ref 32–36.5)
MCV RBC AUTO: 90.8 FL (ref 78–100)
MONOCYTES # BLD: 0.8 K/MCL (ref 0.3–0.9)
MONOCYTES NFR BLD: 10 %
NEUTROPHILS # BLD: 4.2 K/MCL (ref 1.8–7.7)
NEUTROPHILS NFR BLD: 57 %
NRBC BLD MANUAL-RTO: 0 /100 WBC
PLATELET # BLD AUTO: 281 K/MCL (ref 140–450)
POTASSIUM SERPL-SCNC: 4.3 MMOL/L (ref 3.4–5.1)
PROT SERPL-MCNC: 7.2 G/DL (ref 6.4–8.2)
RBC # BLD: 4.76 MIL/MCL (ref 4–5.2)
SODIUM SERPL-SCNC: 140 MMOL/L (ref 135–145)
WBC # BLD: 7.4 K/MCL (ref 4.2–11)

## 2022-04-12 ASSESSMENT — ENCOUNTER SYMPTOMS
RHINORRHEA: 1
ADENOPATHY: 0
FEVER: 0
TREMORS: 0
NAUSEA: 0
LIGHT-HEADEDNESS: 0
BRUISES/BLEEDS EASILY: 0
SLEEP DISTURBANCE: 0
SORE THROAT: 0
COUGH: 0
DIARRHEA: 0
ABDOMINAL PAIN: 0
CONFUSION: 0
EYE ITCHING: 0
DIZZINESS: 0
CHILLS: 0
WEAKNESS: 0
UNEXPECTED WEIGHT CHANGE: 0
WHEEZING: 1
APPETITE CHANGE: 0
POLYDIPSIA: 0
NUMBNESS: 0
ACTIVITY CHANGE: 0
EYE DISCHARGE: 0
FATIGUE: 0
SHORTNESS OF BREATH: 0
HEADACHES: 0
CONSTIPATION: 0
NERVOUS/ANXIOUS: 0
TROUBLE SWALLOWING: 0

## 2022-04-13 LAB — VZV IGG SER IA-ACNC: NORMAL

## 2022-04-29 ENCOUNTER — TELEPHONE (OUTPATIENT)
Dept: OBGYN | Age: 27
End: 2022-04-29

## 2022-06-17 DIAGNOSIS — J45.990 EXERCISE-INDUCED BRONCHOSPASM: Primary | ICD-10-CM

## 2022-06-23 ENCOUNTER — PATIENT SELF-TRIAGE (OUTPATIENT)
Dept: OTHER | Age: 27
End: 2022-06-23

## 2022-06-23 DIAGNOSIS — J45.990 EXERCISE-INDUCED BRONCHOSPASM: Primary | ICD-10-CM

## 2022-06-23 DIAGNOSIS — Z11.52 ENCOUNTER FOR SCREENING LABORATORY TESTING FOR COVID-19 VIRUS IN ASYMPTOMATIC PATIENT: ICD-10-CM

## 2022-06-23 DIAGNOSIS — Z01.812 ENCOUNTER FOR SCREENING LABORATORY TESTING FOR COVID-19 VIRUS IN ASYMPTOMATIC PATIENT: ICD-10-CM

## 2022-07-13 ENCOUNTER — HOSPITAL ENCOUNTER (OUTPATIENT)
Dept: RESPIRATORY THERAPY | Age: 27
Discharge: HOME | End: 2022-07-13
Attending: INTERNAL MEDICINE

## 2022-07-13 ENCOUNTER — TELEPHONE (OUTPATIENT)
Dept: SCHEDULING | Age: 27
End: 2022-07-13

## 2022-07-13 DIAGNOSIS — J45.990 EXERCISE-INDUCED BRONCHOSPASM: ICD-10-CM

## 2022-07-16 ENCOUNTER — LAB SERVICES (OUTPATIENT)
Dept: URGENT CARE | Age: 27
End: 2022-07-16

## 2022-07-16 DIAGNOSIS — Z11.52 ENCOUNTER FOR SCREENING LABORATORY TESTING FOR COVID-19 VIRUS IN ASYMPTOMATIC PATIENT: ICD-10-CM

## 2022-07-16 DIAGNOSIS — Z01.812 ENCOUNTER FOR SCREENING LABORATORY TESTING FOR COVID-19 VIRUS IN ASYMPTOMATIC PATIENT: ICD-10-CM

## 2022-07-16 PROCEDURE — U0003 INFECTIOUS AGENT DETECTION BY NUCLEIC ACID (DNA OR RNA); SEVERE ACUTE RESPIRATORY SYNDROME CORONAVIRUS 2 (SARS-COV-2) (CORONAVIRUS DISEASE [COVID-19]), AMPLIFIED PROBE TECHNIQUE, MAKING USE OF HIGH THROUGHPUT TECHNOLOGIES AS DESCRIBED BY CMS-2020-01-R: HCPCS | Performed by: INTERNAL MEDICINE

## 2022-07-16 PROCEDURE — U0005 INFEC AGEN DETEC AMPLI PROBE: HCPCS | Performed by: INTERNAL MEDICINE

## 2022-07-17 LAB
SARS-COV-2 RNA RESP QL NAA+PROBE: NOT DETECTED
SERVICE CMNT-IMP: NORMAL
SERVICE CMNT-IMP: NORMAL

## 2022-07-19 ENCOUNTER — HOSPITAL ENCOUNTER (OUTPATIENT)
Dept: RESPIRATORY THERAPY | Age: 27
Discharge: HOME | End: 2022-07-19
Attending: INTERNAL MEDICINE

## 2022-07-19 DIAGNOSIS — J45.990 EXERCISE-INDUCED BRONCHOSPASM: ICD-10-CM

## 2022-07-19 PROCEDURE — 94060 EVALUATION OF WHEEZING: CPT

## 2022-07-19 PROCEDURE — 94726 PLETHYSMOGRAPHY LUNG VOLUMES: CPT

## 2022-07-19 PROCEDURE — 94729 DIFFUSING CAPACITY: CPT

## 2022-07-21 ENCOUNTER — E-ADVICE (OUTPATIENT)
Dept: INTERNAL MEDICINE | Age: 27
End: 2022-07-21

## 2022-07-22 ENCOUNTER — OFFICE VISIT (OUTPATIENT)
Dept: OBGYN | Age: 27
End: 2022-07-22

## 2022-07-22 VITALS
HEIGHT: 64 IN | HEART RATE: 70 BPM | WEIGHT: 146 LBS | OXYGEN SATURATION: 100 % | BODY MASS INDEX: 24.92 KG/M2 | SYSTOLIC BLOOD PRESSURE: 107 MMHG | DIASTOLIC BLOOD PRESSURE: 71 MMHG

## 2022-07-22 DIAGNOSIS — Z01.419 GYNECOLOGIC EXAM NORMAL: Primary | ICD-10-CM

## 2022-07-22 PROCEDURE — 88175 CYTOPATH C/V AUTO FLUID REDO: CPT | Performed by: INTERNAL MEDICINE

## 2022-07-22 PROCEDURE — 87491 CHLMYD TRACH DNA AMP PROBE: CPT | Performed by: INTERNAL MEDICINE

## 2022-07-22 PROCEDURE — 99385 PREV VISIT NEW AGE 18-39: CPT | Performed by: OBSTETRICS & GYNECOLOGY

## 2022-07-22 PROCEDURE — 87591 N.GONORRHOEAE DNA AMP PROB: CPT | Performed by: INTERNAL MEDICINE

## 2022-07-22 ASSESSMENT — ENCOUNTER SYMPTOMS
NEUROLOGICAL NEGATIVE: 1
CONSTITUTIONAL NEGATIVE: 1
PSYCHIATRIC NEGATIVE: 1
ALLERGIC/IMMUNOLOGIC NEGATIVE: 1
RESPIRATORY NEGATIVE: 1
ENDOCRINE NEGATIVE: 1
GASTROINTESTINAL NEGATIVE: 1

## 2022-07-22 ASSESSMENT — PATIENT HEALTH QUESTIONNAIRE - PHQ9
1. LITTLE INTEREST OR PLEASURE IN DOING THINGS: NOT AT ALL
CLINICAL INTERPRETATION OF PHQ2 SCORE: NO FURTHER SCREENING NEEDED
SUM OF ALL RESPONSES TO PHQ9 QUESTIONS 1 AND 2: 0
SUM OF ALL RESPONSES TO PHQ9 QUESTIONS 1 AND 2: 0
2. FEELING DOWN, DEPRESSED OR HOPELESS: NOT AT ALL

## 2022-07-22 ASSESSMENT — PAIN SCALES - GENERAL: PAINLEVEL: 0

## 2022-07-25 LAB
C TRACH RRNA SPEC QL NAA+PROBE: NEGATIVE
Lab: NORMAL
N GONORRHOEA RRNA SPEC QL NAA+PROBE: NEGATIVE

## 2022-07-26 LAB — HOLD SPECIMEN: NORMAL

## 2022-07-29 LAB
CASE RPRT: NORMAL
CLINICAL INFO: NORMAL
CYTOLOGY CVX/VAG STUDY: NORMAL
PAP EDUCATIONAL NOTE: NORMAL
SPECIMEN ADEQUACY: NORMAL

## 2023-02-06 ENCOUNTER — TELEPHONE (OUTPATIENT)
Dept: INTERNAL MEDICINE | Age: 28
End: 2023-02-06

## 2023-02-10 ENCOUNTER — TELEPHONE (OUTPATIENT)
Dept: INTERNAL MEDICINE | Age: 28
End: 2023-02-10

## 2023-02-11 ENCOUNTER — LAB SERVICES (OUTPATIENT)
Dept: LAB | Age: 28
End: 2023-02-11

## 2023-02-11 ENCOUNTER — OFFICE VISIT (OUTPATIENT)
Dept: INTERNAL MEDICINE | Age: 28
End: 2023-02-11

## 2023-02-11 VITALS
HEIGHT: 64 IN | SYSTOLIC BLOOD PRESSURE: 109 MMHG | WEIGHT: 155.87 LBS | BODY MASS INDEX: 26.61 KG/M2 | TEMPERATURE: 98.4 F | DIASTOLIC BLOOD PRESSURE: 67 MMHG | HEART RATE: 87 BPM

## 2023-02-11 DIAGNOSIS — Z23 NEED FOR VACCINATION: ICD-10-CM

## 2023-02-11 DIAGNOSIS — R53.82 CHRONIC FATIGUE: Primary | ICD-10-CM

## 2023-02-11 DIAGNOSIS — J30.89 PERENNIAL ALLERGIC RHINITIS: ICD-10-CM

## 2023-02-11 DIAGNOSIS — J34.2 DEVIATED NASAL SEPTUM: ICD-10-CM

## 2023-02-11 DIAGNOSIS — R53.82 CHRONIC FATIGUE: ICD-10-CM

## 2023-02-11 DIAGNOSIS — J45.990 EXERCISE-INDUCED BRONCHOSPASM: ICD-10-CM

## 2023-02-11 PROBLEM — L70.0 BLACKHEAD: Status: RESOLVED | Noted: 2022-04-11 | Resolved: 2023-02-11

## 2023-02-11 PROBLEM — H52.7 REFRACTION ERROR: Status: RESOLVED | Noted: 2020-09-14 | Resolved: 2023-02-11

## 2023-02-11 PROBLEM — Z00.00 ENCOUNTER FOR PREVENTIVE HEALTH EXAMINATION: Status: RESOLVED | Noted: 2022-04-11 | Resolved: 2023-02-11

## 2023-02-11 PROBLEM — L29.9 PRURITUS: Status: RESOLVED | Noted: 2020-09-14 | Resolved: 2023-02-11

## 2023-02-11 PROBLEM — K21.9 LARYNGOPHARYNGEAL REFLUX: Status: RESOLVED | Noted: 2022-04-11 | Resolved: 2023-02-11

## 2023-02-11 PROBLEM — L98.9 SKIN LESION OF LEFT ARM: Status: RESOLVED | Noted: 2022-04-11 | Resolved: 2023-02-11

## 2023-02-11 LAB
25(OH)D3+25(OH)D2 SERPL-MCNC: 18.1 NG/ML (ref 30–100)
ALBUMIN SERPL-MCNC: 3.8 G/DL (ref 3.6–5.1)
ALBUMIN/GLOB SERPL: 1.2 {RATIO} (ref 1–2.4)
ALP SERPL-CCNC: 52 UNITS/L (ref 45–117)
ALT SERPL-CCNC: 21 UNITS/L
ANION GAP SERPL CALC-SCNC: 9 MMOL/L (ref 7–19)
AST SERPL-CCNC: 17 UNITS/L
BASOPHILS # BLD: 0.1 K/MCL (ref 0–0.3)
BASOPHILS NFR BLD: 1 %
BILIRUB SERPL-MCNC: 0.2 MG/DL (ref 0.2–1)
BUN SERPL-MCNC: 8 MG/DL (ref 6–20)
BUN/CREAT SERPL: 11 (ref 7–25)
CALCIUM SERPL-MCNC: 8.8 MG/DL (ref 8.4–10.2)
CHLORIDE SERPL-SCNC: 109 MMOL/L (ref 97–110)
CO2 SERPL-SCNC: 26 MMOL/L (ref 21–32)
CREAT SERPL-MCNC: 0.76 MG/DL (ref 0.51–0.95)
DEPRECATED RDW RBC: 41.3 FL (ref 39–50)
EOSINOPHIL # BLD: 0.2 K/MCL (ref 0–0.5)
EOSINOPHIL NFR BLD: 3 %
ERYTHROCYTE [DISTWIDTH] IN BLOOD: 12.5 % (ref 11–15)
FASTING DURATION TIME PATIENT: NORMAL H
GFR SERPLBLD BASED ON 1.73 SQ M-ARVRAT: >90 ML/MIN
GLOBULIN SER-MCNC: 3.1 G/DL (ref 2–4)
GLUCOSE SERPL-MCNC: 93 MG/DL (ref 70–99)
HCT VFR BLD CALC: 42.6 % (ref 36–46.5)
HGB BLD-MCNC: 14 G/DL (ref 12–15.5)
IMM GRANULOCYTES # BLD AUTO: 0 K/MCL (ref 0–0.2)
IMM GRANULOCYTES # BLD: 0 %
LYMPHOCYTES # BLD: 1.7 K/MCL (ref 1–4.8)
LYMPHOCYTES NFR BLD: 33 %
MCH RBC QN AUTO: 29.4 PG (ref 26–34)
MCHC RBC AUTO-ENTMCNC: 32.9 G/DL (ref 32–36.5)
MCV RBC AUTO: 89.3 FL (ref 78–100)
MONOCYTES # BLD: 0.5 K/MCL (ref 0.3–0.9)
MONOCYTES NFR BLD: 9 %
NEUTROPHILS # BLD: 2.9 K/MCL (ref 1.8–7.7)
NEUTROPHILS NFR BLD: 54 %
NRBC BLD MANUAL-RTO: 0 /100 WBC
PLATELET # BLD AUTO: 261 K/MCL (ref 140–450)
POTASSIUM SERPL-SCNC: 4.1 MMOL/L (ref 3.4–5.1)
PROT SERPL-MCNC: 6.9 G/DL (ref 6.4–8.2)
RBC # BLD: 4.77 MIL/MCL (ref 4–5.2)
SODIUM SERPL-SCNC: 140 MMOL/L (ref 135–145)
TSH SERPL-ACNC: 1.59 MCUNITS/ML (ref 0.35–5)
WBC # BLD: 5.3 K/MCL (ref 4.2–11)

## 2023-02-11 PROCEDURE — 85025 COMPLETE CBC W/AUTO DIFF WBC: CPT | Performed by: INTERNAL MEDICINE

## 2023-02-11 PROCEDURE — 82306 VITAMIN D 25 HYDROXY: CPT | Performed by: INTERNAL MEDICINE

## 2023-02-11 PROCEDURE — 99214 OFFICE O/P EST MOD 30 MIN: CPT | Performed by: INTERNAL MEDICINE

## 2023-02-11 PROCEDURE — 80053 COMPREHEN METABOLIC PANEL: CPT | Performed by: INTERNAL MEDICINE

## 2023-02-11 PROCEDURE — 84443 ASSAY THYROID STIM HORMONE: CPT | Performed by: INTERNAL MEDICINE

## 2023-02-11 PROCEDURE — 90686 IIV4 VACC NO PRSV 0.5 ML IM: CPT | Performed by: INTERNAL MEDICINE

## 2023-02-11 PROCEDURE — 90471 IMMUNIZATION ADMIN: CPT | Performed by: INTERNAL MEDICINE

## 2023-02-11 PROCEDURE — 36415 COLL VENOUS BLD VENIPUNCTURE: CPT | Performed by: INTERNAL MEDICINE

## 2023-02-11 RX ORDER — ALBUTEROL SULFATE 90 UG/1
2 AEROSOL, METERED RESPIRATORY (INHALATION) EVERY 4 HOURS PRN
Qty: 1 EACH | Refills: 0 | Status: SHIPPED | OUTPATIENT
Start: 2023-02-11 | End: 2023-02-13 | Stop reason: SDUPTHER

## 2023-02-11 ASSESSMENT — PATIENT HEALTH QUESTIONNAIRE - PHQ9
CLINICAL INTERPRETATION OF PHQ2 SCORE: NO FURTHER SCREENING NEEDED
1. LITTLE INTEREST OR PLEASURE IN DOING THINGS: NOT AT ALL
SUM OF ALL RESPONSES TO PHQ9 QUESTIONS 1 AND 2: 0
SUM OF ALL RESPONSES TO PHQ9 QUESTIONS 1 AND 2: 0
2. FEELING DOWN, DEPRESSED OR HOPELESS: NOT AT ALL

## 2023-02-11 ASSESSMENT — PAIN SCALES - GENERAL: PAINLEVEL: 0

## 2023-02-12 ENCOUNTER — E-ADVICE (OUTPATIENT)
Dept: INTERNAL MEDICINE | Age: 28
End: 2023-02-12

## 2023-02-12 DIAGNOSIS — J45.990 EXERCISE-INDUCED BRONCHOSPASM: ICD-10-CM

## 2023-02-12 DIAGNOSIS — E55.9 VITAMIN D DEFICIENCY: Primary | ICD-10-CM

## 2023-02-12 DIAGNOSIS — J30.89 PERENNIAL ALLERGIC RHINITIS: ICD-10-CM

## 2023-02-13 PROBLEM — R53.82 CHRONIC FATIGUE: Status: ACTIVE | Noted: 2023-02-13

## 2023-02-13 RX ORDER — ALBUTEROL SULFATE 90 UG/1
2 AEROSOL, METERED RESPIRATORY (INHALATION) EVERY 4 HOURS PRN
Qty: 18 G | Refills: 5 | Status: SHIPPED | OUTPATIENT
Start: 2023-02-13

## 2023-02-14 RX ORDER — LEVOCETIRIZINE DIHYDROCHLORIDE 5 MG/1
5 TABLET, FILM COATED ORAL EVERY EVENING
Qty: 90 TABLET | Refills: 1 | Status: SHIPPED | OUTPATIENT
Start: 2023-02-14

## 2023-02-15 ENCOUNTER — APPOINTMENT (OUTPATIENT)
Dept: INTERNAL MEDICINE | Age: 28
End: 2023-02-15

## 2023-02-16 ENCOUNTER — APPOINTMENT (OUTPATIENT)
Dept: INTERNAL MEDICINE | Age: 28
End: 2023-02-16

## 2023-05-13 ENCOUNTER — HOSPITAL ENCOUNTER (EMERGENCY)
Facility: HOSPITAL | Age: 28
Discharge: HOME | End: 2023-05-13
Attending: STUDENT IN AN ORGANIZED HEALTH CARE EDUCATION/TRAINING PROGRAM

## 2023-05-13 ENCOUNTER — APPOINTMENT (OUTPATIENT)
Dept: GENERAL RADIOLOGY | Facility: HOSPITAL | Age: 28
End: 2023-05-13
Attending: STUDENT IN AN ORGANIZED HEALTH CARE EDUCATION/TRAINING PROGRAM

## 2023-05-13 VITALS
HEART RATE: 72 BPM | BODY MASS INDEX: 23.9 KG/M2 | DIASTOLIC BLOOD PRESSURE: 78 MMHG | SYSTOLIC BLOOD PRESSURE: 124 MMHG | RESPIRATION RATE: 16 BRPM | WEIGHT: 140 LBS | HEIGHT: 64 IN | TEMPERATURE: 100 F | OXYGEN SATURATION: 98 %

## 2023-05-13 DIAGNOSIS — U07.1 COVID-19: Primary | ICD-10-CM

## 2023-05-13 DIAGNOSIS — R21 RASH: ICD-10-CM

## 2023-05-13 DIAGNOSIS — J02.0 STREPTOCOCCAL SORE THROAT: ICD-10-CM

## 2023-05-13 LAB
S PYO AG THROAT QL: POSITIVE
SARS-COV-2 RNA RESP QL NAA+PROBE: DETECTED

## 2023-05-13 PROCEDURE — 71045 X-RAY EXAM CHEST 1 VIEW: CPT | Performed by: STUDENT IN AN ORGANIZED HEALTH CARE EDUCATION/TRAINING PROGRAM

## 2023-05-13 PROCEDURE — 99284 EMERGENCY DEPT VISIT MOD MDM: CPT

## 2023-05-13 PROCEDURE — 87880 STREP A ASSAY W/OPTIC: CPT

## 2023-05-13 PROCEDURE — 99285 EMERGENCY DEPT VISIT HI MDM: CPT

## 2023-05-13 RX ORDER — AMOXICILLIN 500 MG/1
500 TABLET, FILM COATED ORAL 3 TIMES DAILY
Qty: 30 TABLET | Refills: 0 | Status: SHIPPED | OUTPATIENT
Start: 2023-05-13 | End: 2023-05-23

## 2023-05-13 NOTE — ED INITIAL ASSESSMENT (HPI)
Patient arrives ambulatory through triage with complaints of cough, fevers last night. Patient reports sick contact at home with strep and COVID.

## 2023-05-13 NOTE — ED QUICK NOTES
Pt to ED per fever starting yesterday, pts mother tested +covid and strep, she in the house with pt. Pt c/o MATTIE yesterday, resolved after using inhaler. Pt denies MATTIE/CP at this time. Pt taking mucinex, denies taking ibuprofen today.

## 2023-08-24 ENCOUNTER — HOSPITAL ENCOUNTER (EMERGENCY)
Facility: HOSPITAL | Age: 28
Discharge: HOME | End: 2023-08-24
Attending: EMERGENCY MEDICINE
Payer: MEDICAID

## 2023-08-24 VITALS
SYSTOLIC BLOOD PRESSURE: 129 MMHG | HEART RATE: 67 BPM | RESPIRATION RATE: 19 BRPM | TEMPERATURE: 98 F | WEIGHT: 140 LBS | OXYGEN SATURATION: 98 % | BODY MASS INDEX: 23.9 KG/M2 | DIASTOLIC BLOOD PRESSURE: 86 MMHG | HEIGHT: 64 IN

## 2023-08-24 DIAGNOSIS — B34.9 VIRAL SYNDROME: Primary | ICD-10-CM

## 2023-08-24 LAB
S PYO AG THROAT QL: NEGATIVE
SARS-COV-2 RNA RESP QL NAA+PROBE: NOT DETECTED

## 2023-08-24 PROCEDURE — 99283 EMERGENCY DEPT VISIT LOW MDM: CPT

## 2023-08-24 PROCEDURE — 87880 STREP A ASSAY W/OPTIC: CPT

## 2023-08-24 PROCEDURE — 99284 EMERGENCY DEPT VISIT MOD MDM: CPT

## 2023-08-24 PROCEDURE — S0119 ONDANSETRON 4 MG: HCPCS | Performed by: EMERGENCY MEDICINE

## 2023-08-24 RX ORDER — IBUPROFEN 600 MG/1
600 TABLET ORAL EVERY 8 HOURS PRN
Qty: 30 TABLET | Refills: 0 | Status: SHIPPED | OUTPATIENT
Start: 2023-08-24 | End: 2023-08-29

## 2023-08-24 RX ORDER — LOPERAMIDE HYDROCHLORIDE 2 MG/1
2 TABLET ORAL AS NEEDED
Qty: 20 TABLET | Refills: 0 | Status: SHIPPED | OUTPATIENT
Start: 2023-08-24 | End: 2023-09-23

## 2023-08-24 RX ORDER — ONDANSETRON 4 MG/1
4 TABLET, ORALLY DISINTEGRATING ORAL ONCE
Status: COMPLETED | OUTPATIENT
Start: 2023-08-24 | End: 2023-08-24

## 2023-08-24 RX ORDER — ONDANSETRON 4 MG/1
4 TABLET, ORALLY DISINTEGRATING ORAL EVERY 4 HOURS PRN
Qty: 10 TABLET | Refills: 0 | Status: SHIPPED | OUTPATIENT
Start: 2023-08-24 | End: 2023-08-29

## 2023-08-25 NOTE — ED INITIAL ASSESSMENT (HPI)
Pt c/o nausea/vomiting/diarrhea x 2 days. Pt also c/o sore throat and cough. +fevers. Pt also c/o left ear pain after flying yesterday.

## 2023-08-25 NOTE — ED QUICK NOTES
Pt presents to ED w/c of N/V/D for about 5 days with worsening in the last 2 days, and a cough. The pt states, \"I get strep throat a lot and they took a test in the front but I want to make sure I didn't catch like a stomach bug or something\". Pt denies the need for nausea medication at this moment. In room in no obvious distress.

## 2023-08-29 ENCOUNTER — OFFICE VISIT (OUTPATIENT)
Dept: INTERNAL MEDICINE CLINIC | Facility: CLINIC | Age: 28
End: 2023-08-29
Payer: MEDICAID

## 2023-08-29 ENCOUNTER — LAB ENCOUNTER (OUTPATIENT)
Dept: LAB | Facility: HOSPITAL | Age: 28
End: 2023-08-29
Attending: FAMILY MEDICINE
Payer: MEDICAID

## 2023-08-29 VITALS
WEIGHT: 151 LBS | OXYGEN SATURATION: 98 % | HEART RATE: 84 BPM | BODY MASS INDEX: 25.78 KG/M2 | TEMPERATURE: 98 F | HEIGHT: 64 IN | DIASTOLIC BLOOD PRESSURE: 74 MMHG | SYSTOLIC BLOOD PRESSURE: 118 MMHG

## 2023-08-29 DIAGNOSIS — K21.9 GASTROESOPHAGEAL REFLUX DISEASE, UNSPECIFIED WHETHER ESOPHAGITIS PRESENT: ICD-10-CM

## 2023-08-29 DIAGNOSIS — J30.2 SEASONAL ALLERGIES: ICD-10-CM

## 2023-08-29 DIAGNOSIS — J34.2 DEVIATED NASAL SEPTUM: ICD-10-CM

## 2023-08-29 DIAGNOSIS — H69.92 DYSFUNCTION OF LEFT EUSTACHIAN TUBE: ICD-10-CM

## 2023-08-29 DIAGNOSIS — J45.990 EXERCISE-INDUCED BRONCHOSPASM: ICD-10-CM

## 2023-08-29 DIAGNOSIS — R19.5 LOOSE STOOLS: ICD-10-CM

## 2023-08-29 DIAGNOSIS — B34.9 VIRAL SYNDROME: ICD-10-CM

## 2023-08-29 DIAGNOSIS — R19.5 LOOSE STOOLS: Primary | ICD-10-CM

## 2023-08-29 DIAGNOSIS — J35.1 ENLARGED TONSILS: ICD-10-CM

## 2023-08-29 DIAGNOSIS — J34.3 HYPERTROPHY OF NASAL TURBINATES: ICD-10-CM

## 2023-08-29 PROBLEM — G44.89 OTHER HEADACHE SYNDROME: Status: ACTIVE | Noted: 2023-08-29

## 2023-08-29 LAB
ALBUMIN SERPL-MCNC: 3.3 G/DL (ref 3.4–5)
ALBUMIN/GLOB SERPL: 0.9 {RATIO} (ref 1–2)
ALP LIVER SERPL-CCNC: 54 U/L
ALT SERPL-CCNC: 19 U/L
ANION GAP SERPL CALC-SCNC: 7 MMOL/L (ref 0–18)
AST SERPL-CCNC: 18 U/L (ref 15–37)
BASOPHILS # BLD AUTO: 0.08 X10(3) UL (ref 0–0.2)
BASOPHILS NFR BLD AUTO: 0.7 %
BILIRUB SERPL-MCNC: 0.3 MG/DL (ref 0.1–2)
BUN BLD-MCNC: 5 MG/DL (ref 7–18)
BUN/CREAT SERPL: 6.8 (ref 10–20)
CALCIUM BLD-MCNC: 9.1 MG/DL (ref 8.5–10.1)
CHLORIDE SERPL-SCNC: 108 MMOL/L (ref 98–112)
CHOLEST SERPL-MCNC: 145 MG/DL (ref ?–200)
CO2 SERPL-SCNC: 25 MMOL/L (ref 21–32)
CREAT BLD-MCNC: 0.74 MG/DL
DEPRECATED RDW RBC AUTO: 41.2 FL (ref 35.1–46.3)
EGFRCR SERPLBLD CKD-EPI 2021: 114 ML/MIN/1.73M2 (ref 60–?)
EOSINOPHIL # BLD AUTO: 0.35 X10(3) UL (ref 0–0.7)
EOSINOPHIL NFR BLD AUTO: 3.1 %
ERYTHROCYTE [DISTWIDTH] IN BLOOD BY AUTOMATED COUNT: 12.8 % (ref 11–15)
FASTING PATIENT LIPID ANSWER: YES
FASTING STATUS PATIENT QL REPORTED: YES
GLOBULIN PLAS-MCNC: 3.8 G/DL (ref 2.8–4.4)
GLUCOSE BLD-MCNC: 92 MG/DL (ref 70–99)
HAV IGM SER QL: NONREACTIVE
HBV CORE IGM SER QL: NONREACTIVE
HBV SURFACE AG SERPL QL IA: NONREACTIVE
HCT VFR BLD AUTO: 39 %
HCV AB SERPL QL IA: NONREACTIVE
HDLC SERPL-MCNC: 46 MG/DL (ref 40–59)
HGB BLD-MCNC: 12.8 G/DL
IMM GRANULOCYTES # BLD AUTO: 0.04 X10(3) UL (ref 0–1)
IMM GRANULOCYTES NFR BLD: 0.4 %
LDLC SERPL CALC-MCNC: 85 MG/DL (ref ?–100)
LYMPHOCYTES # BLD AUTO: 2 X10(3) UL (ref 1–4)
LYMPHOCYTES NFR BLD AUTO: 17.5 %
MCH RBC QN AUTO: 28.9 PG (ref 26–34)
MCHC RBC AUTO-ENTMCNC: 32.8 G/DL (ref 31–37)
MCV RBC AUTO: 88 FL
MONOCYTES # BLD AUTO: 0.7 X10(3) UL (ref 0.1–1)
MONOCYTES NFR BLD AUTO: 6.1 %
NEUTROPHILS # BLD AUTO: 8.23 X10 (3) UL (ref 1.5–7.7)
NEUTROPHILS # BLD AUTO: 8.23 X10(3) UL (ref 1.5–7.7)
NEUTROPHILS NFR BLD AUTO: 72.2 %
NONHDLC SERPL-MCNC: 99 MG/DL (ref ?–130)
OSMOLALITY SERPL CALC.SUM OF ELEC: 287 MOSM/KG (ref 275–295)
PLATELET # BLD AUTO: 237 10(3)UL (ref 150–450)
POTASSIUM SERPL-SCNC: 4.2 MMOL/L (ref 3.5–5.1)
PROT SERPL-MCNC: 7.1 G/DL (ref 6.4–8.2)
RBC # BLD AUTO: 4.43 X10(6)UL
SODIUM SERPL-SCNC: 140 MMOL/L (ref 136–145)
TRIGL SERPL-MCNC: 73 MG/DL (ref 30–149)
TSI SER-ACNC: 0.75 MIU/ML (ref 0.36–3.74)
VLDLC SERPL CALC-MCNC: 12 MG/DL (ref 0–30)
WBC # BLD AUTO: 11.4 X10(3) UL (ref 4–11)

## 2023-08-29 PROCEDURE — 80061 LIPID PANEL: CPT

## 2023-08-29 PROCEDURE — 84443 ASSAY THYROID STIM HORMONE: CPT

## 2023-08-29 PROCEDURE — 3078F DIAST BP <80 MM HG: CPT | Performed by: FAMILY MEDICINE

## 2023-08-29 PROCEDURE — 36415 COLL VENOUS BLD VENIPUNCTURE: CPT

## 2023-08-29 PROCEDURE — 3074F SYST BP LT 130 MM HG: CPT | Performed by: FAMILY MEDICINE

## 2023-08-29 PROCEDURE — 85025 COMPLETE CBC W/AUTO DIFF WBC: CPT

## 2023-08-29 PROCEDURE — 3008F BODY MASS INDEX DOCD: CPT | Performed by: FAMILY MEDICINE

## 2023-08-29 PROCEDURE — 99204 OFFICE O/P NEW MOD 45 MIN: CPT | Performed by: FAMILY MEDICINE

## 2023-08-29 PROCEDURE — 80053 COMPREHEN METABOLIC PANEL: CPT

## 2023-08-29 PROCEDURE — 80074 ACUTE HEPATITIS PANEL: CPT

## 2023-08-29 RX ORDER — BENZONATATE 100 MG/1
100 CAPSULE ORAL 3 TIMES DAILY PRN
Qty: 30 CAPSULE | Refills: 0 | Status: SHIPPED | OUTPATIENT
Start: 2023-08-29 | End: 2023-08-30

## 2023-08-29 RX ORDER — ALBUTEROL SULFATE 90 UG/1
2 AEROSOL, METERED RESPIRATORY (INHALATION) EVERY 6 HOURS PRN
Qty: 18 G | Refills: 3 | Status: SHIPPED | OUTPATIENT
Start: 2023-08-29 | End: 2023-08-30

## 2023-08-30 ENCOUNTER — TELEPHONE (OUTPATIENT)
Dept: INTERNAL MEDICINE CLINIC | Facility: CLINIC | Age: 28
End: 2023-08-30

## 2023-08-30 ENCOUNTER — LAB ENCOUNTER (OUTPATIENT)
Dept: LAB | Facility: HOSPITAL | Age: 28
End: 2023-08-30
Attending: FAMILY MEDICINE
Payer: MEDICAID

## 2023-08-30 DIAGNOSIS — B34.9 VIRAL SYNDROME: ICD-10-CM

## 2023-08-30 DIAGNOSIS — J45.990 EXERCISE-INDUCED BRONCHOSPASM: ICD-10-CM

## 2023-08-30 DIAGNOSIS — J30.2 SEASONAL ALLERGIES: ICD-10-CM

## 2023-08-30 DIAGNOSIS — R19.5 LOOSE STOOLS: ICD-10-CM

## 2023-08-30 LAB
CRYPTOSP AG STL QL IA: NEGATIVE
G LAMBLIA AG STL QL IA: NEGATIVE

## 2023-08-30 PROCEDURE — 87329 GIARDIA AG IA: CPT

## 2023-08-30 PROCEDURE — 87046 STOOL CULTR AEROBIC BACT EA: CPT

## 2023-08-30 PROCEDURE — 87077 CULTURE AEROBIC IDENTIFY: CPT

## 2023-08-30 PROCEDURE — 87427 SHIGA-LIKE TOXIN AG IA: CPT

## 2023-08-30 PROCEDURE — 87272 CRYPTOSPORIDIUM AG IF: CPT

## 2023-08-30 PROCEDURE — 89055 LEUKOCYTE ASSESSMENT FECAL: CPT

## 2023-08-30 PROCEDURE — 87209 SMEAR COMPLEX STAIN: CPT

## 2023-08-30 PROCEDURE — 87045 FECES CULTURE AEROBIC BACT: CPT

## 2023-08-30 PROCEDURE — 87177 OVA AND PARASITES SMEARS: CPT

## 2023-08-30 RX ORDER — BENZONATATE 100 MG/1
100 CAPSULE ORAL 3 TIMES DAILY PRN
Qty: 30 CAPSULE | Refills: 0 | Status: SHIPPED | OUTPATIENT
Start: 2023-08-30

## 2023-08-30 RX ORDER — ALBUTEROL SULFATE 90 UG/1
2 AEROSOL, METERED RESPIRATORY (INHALATION) EVERY 6 HOURS PRN
Qty: 18 G | Refills: 3 | Status: SHIPPED | OUTPATIENT
Start: 2023-08-30

## 2024-01-03 ENCOUNTER — OFFICE VISIT (OUTPATIENT)
Dept: FAMILY MEDICINE CLINIC | Facility: CLINIC | Age: 29
End: 2024-01-03
Payer: MEDICAID

## 2024-01-03 VITALS
BODY MASS INDEX: 25.61 KG/M2 | TEMPERATURE: 99 F | HEART RATE: 90 BPM | RESPIRATION RATE: 16 BRPM | DIASTOLIC BLOOD PRESSURE: 88 MMHG | HEIGHT: 64 IN | WEIGHT: 150 LBS | OXYGEN SATURATION: 99 % | SYSTOLIC BLOOD PRESSURE: 122 MMHG
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
## 2024-01-03 PROCEDURE — 3074F SYST BP LT 130 MM HG: CPT | Performed by: NURSE PRACTITIONER

## 2024-01-03 PROCEDURE — 3008F BODY MASS INDEX DOCD: CPT | Performed by: NURSE PRACTITIONER

## 2024-01-03 PROCEDURE — 90714 TD VACC NO PRESV 7 YRS+ IM: CPT | Performed by: NURSE PRACTITIONER

## 2024-01-03 PROCEDURE — 90471 IMMUNIZATION ADMIN: CPT | Performed by: NURSE PRACTITIONER

## 2024-01-03 PROCEDURE — 99213 OFFICE O/P EST LOW 20 MIN: CPT | Performed by: NURSE PRACTITIONER

## 2024-01-03 PROCEDURE — 3079F DIAST BP 80-89 MM HG: CPT | Performed by: NURSE PRACTITIONER

## 2024-01-03 RX ORDER — AMOXICILLIN AND CLAVULANATE POTASSIUM 875; 125 MG/1; MG/1
1 TABLET, FILM COATED ORAL 2 TIMES DAILY
Qty: 14 TABLET | Refills: 0 | Status: SHIPPED | OUTPATIENT
Start: 2024-01-03 | End: 2024-01-10

## 2024-01-04 ENCOUNTER — HOSPITAL ENCOUNTER (EMERGENCY)
Facility: HOSPITAL | Age: 29
Discharge: HOME | End: 2024-01-04
Attending: EMERGENCY MEDICINE
Payer: MEDICAID

## 2024-01-04 ENCOUNTER — APPOINTMENT (OUTPATIENT)
Dept: GENERAL RADIOLOGY | Facility: HOSPITAL | Age: 29
End: 2024-01-04
Payer: MEDICAID

## 2024-01-04 ENCOUNTER — TELEPHONE (OUTPATIENT)
Dept: FAMILY MEDICINE CLINIC | Facility: CLINIC | Age: 29
End: 2024-01-04

## 2024-01-04 VITALS
OXYGEN SATURATION: 97 % | SYSTOLIC BLOOD PRESSURE: 129 MMHG | HEART RATE: 98 BPM | DIASTOLIC BLOOD PRESSURE: 74 MMHG | RESPIRATION RATE: 20 BRPM | TEMPERATURE: 99 F

## 2024-01-04 DIAGNOSIS — L03.012 CELLULITIS OF FINGER OF LEFT HAND: ICD-10-CM

## 2024-01-04 DIAGNOSIS — S61.459A CAT BITE OF HAND, INITIAL ENCOUNTER: Primary | ICD-10-CM
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
## 2024-01-04 LAB
ALBUMIN SERPL-MCNC: 4.6 G/DL (ref 3.2–4.8)
ALBUMIN/GLOB SERPL: 1.4 {RATIO} (ref 1–2)
ALP LIVER SERPL-CCNC: 66 U/L
ALT SERPL-CCNC: 10 U/L
ANION GAP SERPL CALC-SCNC: 6 MMOL/L (ref 0–18)
AST SERPL-CCNC: 14 U/L (ref ?–34)
BASOPHILS # BLD AUTO: 0.06 X10(3) UL (ref 0–0.2)
BASOPHILS NFR BLD AUTO: 0.6 %
BILIRUB SERPL-MCNC: 0.3 MG/DL (ref 0.3–1.2)
BUN BLD-MCNC: 6 MG/DL (ref 9–23)
BUN/CREAT SERPL: 7.7 (ref 10–20)
CALCIUM BLD-MCNC: 9.5 MG/DL (ref 8.7–10.4)
CHLORIDE SERPL-SCNC: 107 MMOL/L (ref 98–112)
CO2 SERPL-SCNC: 26 MMOL/L (ref 21–32)
CREAT BLD-MCNC: 0.78 MG/DL
DEPRECATED RDW RBC AUTO: 40.2 FL (ref 35.1–46.3)
EGFRCR SERPLBLD CKD-EPI 2021: 107 ML/MIN/1.73M2 (ref 60–?)
EOSINOPHIL # BLD AUTO: 0.28 X10(3) UL (ref 0–0.7)
EOSINOPHIL NFR BLD AUTO: 2.7 %
ERYTHROCYTE [DISTWIDTH] IN BLOOD BY AUTOMATED COUNT: 12.8 % (ref 11–15)
GLOBULIN PLAS-MCNC: 3.4 G/DL (ref 2.8–4.4)
GLUCOSE BLD-MCNC: 85 MG/DL (ref 70–99)
HCT VFR BLD AUTO: 40.5 %
HGB BLD-MCNC: 13.1 G/DL
IMM GRANULOCYTES # BLD AUTO: 0.03 X10(3) UL (ref 0–1)
IMM GRANULOCYTES NFR BLD: 0.3 %
LYMPHOCYTES # BLD AUTO: 1.74 X10(3) UL (ref 1–4)
LYMPHOCYTES NFR BLD AUTO: 16.5 %
MAGNESIUM SERPL-MCNC: 2 MG/DL (ref 1.6–2.6)
MCH RBC QN AUTO: 27.9 PG (ref 26–34)
MCHC RBC AUTO-ENTMCNC: 32.3 G/DL (ref 31–37)
MCV RBC AUTO: 86.4 FL
MONOCYTES # BLD AUTO: 0.85 X10(3) UL (ref 0.1–1)
MONOCYTES NFR BLD AUTO: 8.1 %
NEUTROPHILS # BLD AUTO: 7.56 X10 (3) UL (ref 1.5–7.7)
NEUTROPHILS # BLD AUTO: 7.56 X10(3) UL (ref 1.5–7.7)
NEUTROPHILS NFR BLD AUTO: 71.8 %
OSMOLALITY SERPL CALC.SUM OF ELEC: 285 MOSM/KG (ref 275–295)
PLATELET # BLD AUTO: 260 10(3)UL (ref 150–450)
POTASSIUM SERPL-SCNC: 3.8 MMOL/L (ref 3.5–5.1)
PROT SERPL-MCNC: 8 G/DL (ref 5.7–8.2)
RBC # BLD AUTO: 4.69 X10(6)UL
SODIUM SERPL-SCNC: 139 MMOL/L (ref 136–145)
WBC # BLD AUTO: 10.5 X10(3) UL (ref 4–11)

## 2024-01-04 PROCEDURE — 85025 COMPLETE CBC W/AUTO DIFF WBC: CPT | Performed by: EMERGENCY MEDICINE

## 2024-01-04 PROCEDURE — 80053 COMPREHEN METABOLIC PANEL: CPT | Performed by: EMERGENCY MEDICINE

## 2024-01-04 PROCEDURE — 73130 X-RAY EXAM OF HAND: CPT

## 2024-01-04 PROCEDURE — 96375 TX/PRO/DX INJ NEW DRUG ADDON: CPT

## 2024-01-04 PROCEDURE — 85025 COMPLETE CBC W/AUTO DIFF WBC: CPT

## 2024-01-04 PROCEDURE — 99284 EMERGENCY DEPT VISIT MOD MDM: CPT

## 2024-01-04 PROCEDURE — 80053 COMPREHEN METABOLIC PANEL: CPT

## 2024-01-04 PROCEDURE — 83735 ASSAY OF MAGNESIUM: CPT | Performed by: EMERGENCY MEDICINE

## 2024-01-04 PROCEDURE — 96365 THER/PROPH/DIAG IV INF INIT: CPT

## 2024-01-04 RX ORDER — KETOROLAC TROMETHAMINE 15 MG/ML
15 INJECTION, SOLUTION INTRAMUSCULAR; INTRAVENOUS ONCE
Status: COMPLETED | OUTPATIENT
Start: 2024-01-04 | End: 2024-01-04

## 2024-01-04 NOTE — TELEPHONE ENCOUNTER
[facility] visit follow up.  Patient seen yesterday.  Reports swelling migrating to other fingers as well as pain is increasing.  States started antibiotics last night and taking as prescribed.  Discussed potential of adverse outcome of cat bites and advised to be evaluated in [facility] ER for XR and possible IV antibiotics.  Verbalized understanding.

## 2024-01-04 NOTE — PROGRESS NOTES
CHIEF COMPLAINT:     Chief Complaint   Patient presents with    Bite     Cat bite R hand, deep bite         HPI:     Naomi Martinez is a 28 year old female who presents with concerns of cat bite to right hand.  Occurred within a couple of hours prior to arrival to [facility].  This was patient's own adult cat, fully vaccinated and UTD on rabies, just had check up.  Pt works at vet office.  Her cat was fighting with a stray that she just had taken in and when she had pulled them apart her own cat accidentally bit her.  She went into work just after the bite and thoroughly cleaned it out with betadine, surgical scrub, and alcohol then wrapped it up.  Pt reports essentially just here for prophylactic antibiotic.  Reports tenderness and mild swelling to the puncture areas, she sees 4 punctures in total.  She is still able to move the hand/fingers without difficulty in ROM (except for some pain).  There is no discharge or streaking.  Denies fever/systemic symptoms.       Current Outpatient Medications   Medication Sig Dispense Refill    amoxicillin clavulanate 875-125 MG Oral Tab Take 1 tablet by mouth 2 (two) times daily for 7 days. 14 tablet 0    albuterol 108 (90 Base) MCG/ACT Inhalation Aero Soln Inhale 2 puffs into the lungs every 6 (six) hours as needed for Wheezing. 18 g 3    benzonatate 100 MG Oral Cap Take 1 capsule (100 mg total) by mouth 3 (three) times daily as needed for cough. 30 capsule 0      Past Medical History:   Diagnosis Date    Deviated nasal septum 04/11/2022    GERD (gastroesophageal reflux disease) 08/29/2023    Hypertrophy of nasal turbinates 04/11/2022    Seasonal allergies 08/29/2023      Social History:  Social History     Socioeconomic History    Marital status: Single   Tobacco Use    Smoking status: Never    Smokeless tobacco: Never   Vaping Use    Vaping Use: Former    Substances: Nicotine, Flavoring   Substance and Sexual Activity    Alcohol use: Yes     Comment: Socially - 2 drinks once a  month    Drug use: Never    Sexual activity: Yes     Partners: Male   Social History Narrative    Relationships:     Children:     Pets:     School:     Work: Works with animals    Origin:     Interests:     Spiritual:             REVIEW OF SYSTEMS:   GENERAL: feels well otherwise, no fever, no chills.  SKIN: as above.  CHEST: no chest pains, no palpitations.  LUNGS: denies shortness of breath with exertion or rest. No wheezing, no cough.  LYMPH: No enlargement of the lymph nodes.  MUSC/SKEL: no joint swelling, no joint stiffness.  CARDIOVASCULAR: denies chest pain on exertion or rest.  GI: no nausea, no vomiting or abdominal pain  NEURO: no abnormal sensation, no tingling of the skin or numbness.    EXAM:   /88   Pulse 90   Temp 98.6 °F (37 °C)   Resp 16   Ht 5' 4\" (1.626 m)   Wt 150 lb (68 kg)   LMP 12/27/2023 (Exact Date)   SpO2 99%   BMI 25.75 kg/m²   GENERAL: Well-appearing, well developed, well nourished, and in no apparent distress  SKIN: +4 tender puncture wounds with proximal trace edema to right dorsal hand. No bleeding, no discharge, no streaking.  Wound does not puncture through hand.  No palpable foreign body.  LUNGS: clear to auscultation bilaterally, no wheezes or rhonchi. Breathing is non labored.  CARDIO: RRR without murmur  EXTREMITIES: no cyanosis, clubbing or edema.  Cap refill brisk- less than 2 seconds. Performs full ROM of right hand/fingers.  Sensation intact.  LYMPH: No lymphadenopathy.          ASSESSMENT AND PLAN:     ASSESSMENT:   Encounter Diagnosis   Name Primary?    Cat bite, initial encounter Yes       PLAN:   - Discussed risk vs benefit of xray imaging and referral to higher level of care, pt declines at this time.  - Advised pt there is a high risk of severe infection with cat bites and stressed must monitor closely.  Strict ED precautions reviewed.  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  Wound again thoroughly  cleansed/explored with wound cleanser.  Antibiotic ointment and bandage/coban wrap applied.  - Td updated.  - Start medication as below.  - Skin care discussed with patient.   - The patient was advised to return within 2 days if sx's persist or worsen.    - Advised to proceed to ER if ever new systemic symptoms/fever, limb/hand swelling, or streaking from wound.  - The patient indicates understanding of these issues and agrees to the plan.    Requested Prescriptions     Signed Prescriptions Disp Refills    amoxicillin clavulanate 875-125 MG Oral Tab 14 tablet 0     Sig: Take 1 tablet by mouth 2 (two) times daily for 7 days.   Medication side effects/instructions reviewed.  Pt verbalizes understanding.    There are no Patient Instructions on file for this visit.

## 2024-01-05 ENCOUNTER — HOSPITAL ENCOUNTER (EMERGENCY)
Facility: HOSPITAL | Age: 29
Discharge: HOME | End: 2024-01-05
Attending: EMERGENCY MEDICINE
Payer: MEDICAID

## 2024-01-05 ENCOUNTER — LAB REQUISITION (OUTPATIENT)
Dept: LAB | Facility: HOSPITAL | Age: 29
End: 2024-01-05
Payer: MEDICAID

## 2024-01-05 VITALS
OXYGEN SATURATION: 100 % | HEART RATE: 85 BPM | DIASTOLIC BLOOD PRESSURE: 86 MMHG | TEMPERATURE: 98 F | RESPIRATION RATE: 18 BRPM | SYSTOLIC BLOOD PRESSURE: 122 MMHG | BODY MASS INDEX: 24.75 KG/M2 | WEIGHT: 145 LBS | HEIGHT: 64 IN

## 2024-01-05 DIAGNOSIS — L03.011 CELLULITIS OF RIGHT FINGER: ICD-10-CM

## 2024-01-05 DIAGNOSIS — L03.113 CELLULITIS OF RIGHT HAND: Primary | ICD-10-CM

## 2024-01-05 DIAGNOSIS — S61.451A CAT BITE OF RIGHT HAND, INITIAL ENCOUNTER: ICD-10-CM
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
## 2024-01-05 LAB — B-HCG UR QL: NEGATIVE

## 2024-01-05 PROCEDURE — 87070 CULTURE OTHR SPECIMN AEROBIC: CPT | Performed by: PLASTIC SURGERY

## 2024-01-05 PROCEDURE — 87075 CULTR BACTERIA EXCEPT BLOOD: CPT | Performed by: PLASTIC SURGERY

## 2024-01-05 PROCEDURE — 87205 SMEAR GRAM STAIN: CPT | Performed by: PLASTIC SURGERY

## 2024-01-05 PROCEDURE — 81025 URINE PREGNANCY TEST: CPT

## 2024-01-05 RX ORDER — MORPHINE SULFATE 4 MG/ML
4 INJECTION, SOLUTION INTRAMUSCULAR; INTRAVENOUS ONCE
Status: COMPLETED | OUTPATIENT
Start: 2024-01-05 | End: 2024-01-05

## 2024-01-05 RX ORDER — HYDROCODONE BITARTRATE AND ACETAMINOPHEN 5; 325 MG/1; MG/1
1 TABLET ORAL EVERY 6 HOURS PRN
Qty: 10 TABLET | Refills: 0 | Status: SHIPPED | OUTPATIENT
Start: 2024-01-05 | End: 2024-01-12

## 2024-01-05 NOTE — ED PROVIDER NOTES
Patient Seen in: [facility] Emergency Department      History     Chief Complaint   Patient presents with    Bite     Stated Complaint: bite    Subjective:   HPI    Healthy right-hand-dominant 28-year-old female here for evaluation of a cat bite to the palm of the right hand yesterday.  She has had 2 doses of Augmentin.  Swelling and pain getting worse so came to the ER for evaluation.  No fever.  Tetanus is up-to-date yesterday.    Objective:   Past Medical History:   Diagnosis Date    Asthma     Deviated nasal septum 04/11/2022    GERD (gastroesophageal reflux disease) 08/29/2023    Hypertrophy of nasal turbinates 04/11/2022    Seasonal allergies 08/29/2023              History reviewed. No pertinent surgical history.             Social History     Socioeconomic History    Marital status: Single   Tobacco Use    Smoking status: Never    Smokeless tobacco: Never   Vaping Use    Vaping Use: Former    Substances: Nicotine, Flavoring   Substance and Sexual Activity    Alcohol use: Yes     Comment: Socially - 2 drinks once a month    Drug use: Never    Sexual activity: Yes     Partners: Male   Social History Narrative    Relationships:     Children:     Pets:     School:     Work: Works with animals    Origin:     Interests:     Spiritual:                   Review of Systems    Positive for stated complaint: bite  Other systems are as noted in HPI.  Constitutional and vital signs reviewed.      All other systems reviewed and negative except as noted above.    Physical Exam     ED Triage Vitals [01/04/24 2027]   /74   Pulse 98   Resp 20   Temp 98.7 °F (37.1 °C)   Temp src Temporal   SpO2 97 %   O2 Device None (Room air)       Current:/74   Pulse 98   Temp 98.7 °F (37.1 °C) (Temporal)   Resp 20   LMP 12/27/2023 (Exact Date)   SpO2 97%         Physical Exam    Constitutional: Oriented to person, place, and time.  Appears well-developed. No distress.   Head: Normocephalic and atraumatic.   Eyes:  Conjunctivae are normal. Pupils are equal, round, and reactive to light.   Cardiovascular: Normal rate, regular rhythm and intact distal pulses.    Musculoskeletal: 4 small puncture sites overlying the right palmar thenar MCP joint extending laterally and dorsally.  There is evidence of developing likely flexor tenosynovitis of the right fifth digit.  Minimal involvement of the fourth digit but some pain.  She has some swelling without gross fluctuance over the distal portion of the palmar right fifth and fourth metacarpal regions.  She has mild pain with wrist movement but no swelling.  Radial pulses strong.  No crepitus.  Neurological: Alert and oriented to person, place, and time.   Skin: Skin is warm and dry.   Nursing note and vitals reviewed.    Differential diagnosis includes right hand cat bite, cellulitis and developing flexor tenosynovitis.      ED Course     Labs Reviewed   COMP METABOLIC PANEL (14) - Abnormal; Notable for the following components:       Result Value    BUN 6 (*)     BUN/CREA Ratio 7.7 (*)     All other components within normal limits   MAGNESIUM - Normal   CBC WITH DIFFERENTIAL WITH PLATELET    Narrative:     The following orders were created for panel order CBC With Differential With Platelet.  Procedure                               Abnormality         Status                     ---------                               -----------         ------                     CBC W/ DIFFERENTIAL[[id number]]                              Final result                 Please view results for these tests on the individual orders.   CBC W/ DIFFERENTIAL             XR HAND (MIN 3 VIEWS), RIGHT (CPT=73130)    Result Date: 1/4/2024  PROCEDURE: XR HAND (MIN 3 VIEWS), RIGHT (CPT=73130)  COMPARISON: None.  INDICATIONS: bite  TECHNIQUE: 4 views were obtained.   FINDINGS:  BONES: No acute fracture.  No dislocation.  No radiopaque foreign body.  No significant degenerative change. SOFT TISSUES: Mild soft tissue  swelling about the proximal 4th and 5th digits. EFFUSION: None visible. OTHER: Negative.         CONCLUSION:   No acute fracture or dislocation.  No radiopaque foreign body.  Mild soft tissue swelling about the proximal 4th and 5th digits.    Dictated by (CST): Pepe Najera MD on 1/04/2024 at 10:09 PM     Finalized by (CST): Pepe Najera MD on 1/04/2024 at 10:10 PM                  MDM                                         Medical Decision Making  Patient clinically appears well.  1 dose of Unasyn given.  Labs and x-ray as noted above.  I called and spoke with on-call hand surgery Dr. Osorio.  He agrees with the plan.  Patient will be seen in the morning at 9 AM in his office for further management.  She will be kept n.p.o. after 3 AM.  I explained the plan to her.  She is comfortable with this.  Hand was dressed before departure.  It is safe for her to continue Tylenol Motrin.    Problems Addressed:  Cat bite of hand, initial encounter: acute illness or injury  Cellulitis of finger of left hand: acute illness or injury    Amount and/or Complexity of Data Reviewed  Labs: ordered. Decision-making details documented in ED Course.  Radiology: ordered and independent interpretation performed. Decision-making details documented in ED Course.     Details:   My gross review of the right hand x-ray did not appreciate obvious evidence of fracture or foreign body    Risk  OTC drugs.  Prescription drug management.  Decision regarding hospitalization.  Elective major surgery with no identified risk factors.        Disposition and Plan     Clinical Impression:  1. Cat bite of hand, initial encounter    2. Cellulitis of finger of left hand         Disposition:  Discharge  1/4/2024 11:17 pm    Follow-up:  Emanuel Osorio MD  [address] [place] [id number] [phone]    Follow up            Medications Prescribed:  Current Discharge Medication List

## 2024-01-05 NOTE — ED PROVIDER NOTES
Patient Seen in: [facility] Emergency Department      History     Chief Complaint   Patient presents with    Animal Bite    Infusion     Stated Complaint: Cat Bite; Sent for IV antiobitics    Subjective:   HPI    28-year-old female presents for evaluation for a cat bite.  Patient was seen yesterday for the same, received a dose of Unasyn, was sent home on Augmentin and saw hand surgery today.  She underwent incision and drainage and then was sent to the ED for admission for IV antibiotics.  Patient reports that the anesthesia is wearing off patient started to have severe pain.  She denies any fevers.    Objective:   No pertinent past medical history.            No pertinent past surgical history.              No pertinent social history.            Review of Systems    Positive for stated complaint: Cat Bite; Sent for IV antiobitics  Other systems are as noted in HPI.  Constitutional and vital signs reviewed.      All other systems reviewed and negative except as noted above.    Physical Exam     ED Triage Vitals [01/05/24 1444]   /86   Pulse 85   Resp 18   Temp 98.1 °F (36.7 °C)   Temp src Temporal   SpO2 100 %   O2 Device None (Room air)       Current:/86   Pulse 85   Temp 98.1 °F (36.7 °C) (Temporal)   Resp 18   Ht 162.6 cm (5' 4\")   Wt 65.8 kg   LMP 12/27/2023 (Exact Date)   SpO2 100%   BMI 24.89 kg/m²         Physical Exam  Vitals and nursing note reviewed.   Constitutional:       Appearance: Normal appearance.   HENT:      Head: Normocephalic and atraumatic.   Cardiovascular:      Rate and Rhythm: Normal rate and regular rhythm.      Pulses: Normal pulses.   Pulmonary:      Effort: Pulmonary effort is normal.      Breath sounds: Normal breath sounds.   Musculoskeletal:         General: Normal range of motion.      Cervical back: Normal range of motion.   Skin:     General: Skin is warm and dry.   Neurological:      General: No focal deficit present.      Mental Status: She is  alert.               ED Course     Labs Reviewed   POCT PREGNANCY URINE - Normal                      MDM                                         Medical Decision Making  Differential diagnosis includes cellulitis, abscess, inadequate pain control    Patient was given some IV morphine and feeling better.  She was given a dose of Unasyn.  She has Augmentin at home.  She will be prescribed some Norco for more severe pain as needed.  She understands not to drive while taking this.  She is to call her hand surgeon tomorrow to discuss how things are healing.  If necessary she may need to return to the ED.  She has follow-up next Wednesday.    Medical Record Review: I personally reviewed available prior medical records for any recent pertinent discharge summaries, testing, and procedures, and reviewed those reports.    Complicating factors: The patient  has a past medical history of Asthma, Deviated nasal septum (04/11/2022), GERD (gastroesophageal reflux disease) (08/29/2023), Hypertrophy of nasal turbinates (04/11/2022), and Seasonal allergies (08/29/2023). and  has no past surgical history on file. that contribute to the medical complexity of this ED evaluation.     Clinical impression as well as any lab results and radiology findings were discussed with the patient and/or caregiver. I personally reviewed all laboratory results and radiology images myself. Patient is advised to follow up with PCP for reevaluation. I provided discharge instructions and return precautions. Patient and/or caregiver voices understanding and agreement with the treatment plan. All questions were addressed and answered.       Problems Addressed:  Cat bite of right hand, initial encounter: acute illness or injury with systemic symptoms  Cellulitis of right hand: acute illness or injury with systemic symptoms    Amount and/or Complexity of Data Reviewed  Discussion of management or test interpretation with external provider(s): Dr. Figueroa  with hand surgery consulted.  He reports that he had advised patient to be admitted to the hospital for IV antibiotics however patient was not amenable to the plan for admission.  He suspects that she should be fine with oral antibiotics given that she is otherwise healthy and there is no lymphangitis present so advised that she come to the ED for another dose of IV antibiotics and is to evaluate the wound tomorrow and call him with how it looks.  Should the wound look worse he will have her return to the ED and he will see her in the ED.    Risk  Prescription drug management.  Parenteral controlled substances.  Decision regarding hospitalization.  Risk Details: IV morphine    Hospitalization discussed with patient for admission for IV antibiotics and she declines.  She feels comfortable discharged on Augmentin.  This was discussed with the hand surgeon as well who is in agreement and will follow-up with patient.        Disposition and Plan     Clinical Impression:  1. Cellulitis of right hand    2. Cat bite of right hand, initial encounter         Disposition:  There is no disposition on file for this visit.  There is no disposition time on file for this visit.    Follow-up:  Cody Saldana MD  [address] [place] [id number]  [phone]    Follow up            Medications Prescribed:  Current Discharge Medication List        START taking these medications    Details   HYDROcodone-acetaminophen 5-325 MG Oral Tab Take 1 tablet by mouth every 6 (six) hours as needed.  Qty: 10 tablet, Refills: 0    Associated Diagnoses: Cellulitis of right hand; Cat bite of right hand, initial encounter

## 2024-01-05 NOTE — ED INITIAL ASSESSMENT (HPI)
Pt presents to ed wit c/o cat bite. Pt states she was bit by her cat.   Pt states she is on abx for the bite.  Pt states there is swelling to her right 4th and 5th digits. Pt reports intermittent pain in her wrist.    Cat is up to date w vaccinations.     Denies fevers, ibuprofen at 11am    Hand is warm to touch. Denies drainage

## 2024-01-06 ENCOUNTER — HOSPITAL ENCOUNTER (EMERGENCY)
Facility: HOSPITAL | Age: 29
Discharge: HOME | End: 2024-01-06
Attending: EMERGENCY MEDICINE
Payer: MEDICAID

## 2024-01-06 VITALS
OXYGEN SATURATION: 100 % | RESPIRATION RATE: 19 BRPM | TEMPERATURE: 100 F | HEART RATE: 82 BPM | DIASTOLIC BLOOD PRESSURE: 62 MMHG | SYSTOLIC BLOOD PRESSURE: 119 MMHG
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
## 2024-01-06 PROCEDURE — 96365 THER/PROPH/DIAG IV INF INIT: CPT

## 2024-01-06 PROCEDURE — 99284 EMERGENCY DEPT VISIT MOD MDM: CPT

## 2024-01-06 NOTE — ED QUICK NOTES
Action and side effects of Norco reviewed.   Pt mother to  patient and take her home   Importance of finishing all prescribed oral antibiotics reviewed.   Importance of MD follow up reviewed.

## 2024-01-06 NOTE — ED PROVIDER NOTES
Patient Seen in: [facility] Emergency Department    History     Chief Complaint   Patient presents with    Bite     Stated Complaint: bite     HPI    27 yo F right hand dominant F presenting for re-evaluation and packing removal from right hand cat bite to distal fifth metacarpal - seen for same 1/4/2024 with right hand cat bite as sustained 1/3/2024 for which nonacute labs obtained and discharged with next morning hand followup yesterday with patient s/p I&D at that time. Seen yesterday in ED for IV antibiotics with consideration for admission though patient deferring. Patient noting improving symptoms since yesterday; denies fevers/chills, nausea/vomiting or drainage and requesting packing removal/dressing change.    Past Medical History:   Diagnosis Date    Asthma     Deviated nasal septum 04/11/2022    GERD (gastroesophageal reflux disease) 08/29/2023    Hypertrophy of nasal turbinates 04/11/2022    Seasonal allergies 08/29/2023       History reviewed. No pertinent surgical history.         Family History   Problem Relation Age of Onset    Other (Rheumatoid arthritis) Mother     No Known Problems Father         Not in contact    Heart Disorder Maternal Grandmother         Heart failure    Diabetes Maternal Grandmother     No Known Problems Maternal Grandfather     No Known Problems Paternal Grandmother     No Known Problems Paternal Grandfather     No Known Problems Half-Sister     Breast Cancer Maternal Aunt     Colon Cancer Neg     Crohn's Disease Neg     Ulcerative Colitis Neg        Social History     Socioeconomic History    Marital status: Single   Tobacco Use    Smoking status: Never    Smokeless tobacco: Never   Vaping Use    Vaping Use: Former    Substances: Nicotine, Flavoring   Substance and Sexual Activity    Alcohol use: Yes     Comment: Socially - 2 drinks once a month    Drug use: Never    Sexual activity: Yes     Partners: Male   Social History Narrative    Relationships:     Children:      Pets:     School:     Work: Works with animals    Origin:     Interests:     Spiritual:            Review of Systems :  Constitutional: As per HPI  Musculoskeletal: (+) right hand cat bite.    Positive for stated complaint: bite  Other systems are as noted in HPI.  Constitutional and vital signs reviewed.      All other systems reviewed and negative except as noted above.    PSFH elements reviewed from today and agreed except as otherwise stated in HPI.    Physical Exam     ED Triage Vitals [01/06/24 1548]   /86   Pulse 87   Resp 20   Temp 99.5 °F (37.5 °C)   Temp src Temporal   SpO2 100 %   O2 Device        Current:/86   Pulse 87   Temp 99.5 °F (37.5 °C) (Temporal)   Resp 20   LMP 12/27/2023 (Exact Date)   SpO2 100%         Physical Exam   Constitutional: No distress.   HEENT: MMM.  Head: Normocephalic.   Eyes: No injection.   Cardiovascular: RUE with 2+ radial pulse and BCR to fifth finger.   Pulmonary/Chest: Effort normal.   Musculoskeletal: No gross deformity. Right distal fifth metacarpal with bites noted to ulnar aspect without expressible purulence or associated crepitance/fluctuance with full/intact ROM to fifth finger and patient reporting improving swelling to fifth finger and ulnar aspect of hand; dressing intact and with packing dislodged upon dressing removal.  Neurological: Alert. Left fifth finger with full/intact ROM to MCP/IP joints.  Skin: Skin is warm.   Psychiatric: Cooperative.  Nursing note and vitals reviewed.        ED Course   Labs Reviewed - No data to display    ED Course as of 01/06/24 2057  ------------------------------------------------------------  Time: 01/06 1630  Comment: Imaging sent to Dr. Figueroa with patient permission with case discussed - given visual and clinical/symptomatic improvement, advising dose of unasyn and ongoing outpatient followup; patient advised of plan and comfortable with same.       MDM   DIFFERENTIAL DIAGNOSIS: After history and physical  exam differential diagnosis includes but is not limited to cellulitis.    Pulse ox: 100%:Normal on RA, as interpreted by myself    Medical Decision Making  Evaluation for cat bite wound now s/p IV unasyn and outpatient hand surgery incision & drainage; wound culture without growth to date in patient with clinical and symptomatic improvement. Dressing removed and hand without crepitance/fluctuance or expressible purulence and with soft compartments without obvious tenosynovitis, packing becoming dislodged upon dressing removal with wound re-dressed. Case d/w hand surgery and pictures of wound shared with patient permission, Dr. Figueroa in agreement with ongoing outpatient management and close followup with unasyn given in ED.    Problems Addressed:  Cat bite, subsequent encounter: acute illness or injury    Amount and/or Complexity of Data Reviewed  External Data Reviewed: labs, radiology and notes.     Details: ED notes from day prior and wound culture from day prior reviewed, 1/4/2024 XR results reviewed  Discussion of management or test interpretation with external provider(s): Case d/w hand surgery Dr. Figueroa    Risk  Prescription drug management.      I was wearing at minimum a facemask and eye protection throughout this encounter with handwashing performed prior and after patient evaluation without personal hand/facial/oropharyngeal contact and gloves worn throughout encounter. See note and/or contact this provider for further PPE details.    Disposition and Plan     Clinical Impression:  1. Cat bite, subsequent encounter        Disposition:  Discharge    Follow-up:  Zane Figueroa MD  [address] [place] [id number]  [phone]    Go to  Followup on Monday as discussed.      Medications Prescribed:  Discharge Medication List as of 1/6/2024  5:20 PM

## 2024-01-06 NOTE — ED INITIAL ASSESSMENT (HPI)
S: Patient presents to the ED for wound recheck, packing removal, and possible repeat IV abx. Pt states that she doesn't feel comfortable removing the packing but denies any changes in her condition including but not limited to fevers, increasing swelling or redness.     B: here in er x 2 days for same.

## 2024-02-06 ENCOUNTER — OFFICE VISIT (OUTPATIENT)
Dept: INTERNAL MEDICINE CLINIC | Facility: CLINIC | Age: 29
End: 2024-02-06
Payer: MEDICAID

## 2024-02-06 VITALS
HEART RATE: 95 BPM | SYSTOLIC BLOOD PRESSURE: 120 MMHG | TEMPERATURE: 98 F | BODY MASS INDEX: 25.61 KG/M2 | WEIGHT: 150 LBS | HEIGHT: 64 IN | DIASTOLIC BLOOD PRESSURE: 74 MMHG | OXYGEN SATURATION: 98 %

## 2024-02-06 DIAGNOSIS — R07.89 CHEST WALL PAIN: ICD-10-CM

## 2024-02-06 DIAGNOSIS — B34.9 VIRAL SYNDROME: ICD-10-CM

## 2024-02-06 DIAGNOSIS — R25.2 MUSCLE CRAMP: ICD-10-CM
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
## 2024-02-06 DIAGNOSIS — R19.5 LOOSE STOOLS: ICD-10-CM
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
## 2024-02-06 RX ORDER — IBUPROFEN 600 MG/1
600 TABLET ORAL EVERY 6 HOURS PRN
COMMUNITY
Start: 2024-01-05 | End: 2024-02-12

## 2024-02-06 RX ORDER — LIDOCAINE 4 G/G
1 PATCH TOPICAL EVERY 24 HOURS
COMMUNITY
Start: 2024-01-30 | End: 2024-02-29

## 2024-02-06 RX ORDER — CYCLOBENZAPRINE HCL 10 MG
10 TABLET ORAL NIGHTLY PRN
COMMUNITY
Start: 2024-01-30 | End: 2024-02-06

## 2024-02-06 NOTE — ASSESSMENT & PLAN NOTE
Patient reports a cramping sensation in her left calf intermittently.  Seems most consistent with a muscle cramp.  Advise staying well-hydrated  Can try vitamin B complex  Advil and Tylenol as needed for discomfort.  ER precautions discussed  Follow-up as needed.

## 2024-02-06 NOTE — PATIENT INSTRUCTIONS
PATIENT INSTRUCTIONS    Thank you for seeing me today, it was a pleasure taking care of you.  Please check out at the  and schedule a follow up appointment.  Return in about 6 months (around 8/6/2024) for physical .  Please remember that the jennifer period for all appointments is 5 minutes. This is to help maximize the amount of time that we can spend together at our visits.    Tylenol and advil as needed for pain  For muscle cramping - stay hydrated, vitamin B complex, tylenol and advil as needed  See hand surgeon - if not covered, may need to contact insurance to see if there is a specific person that is covered, let me know and I will provide referral       Best,  Dr. Saldana

## 2024-02-06 NOTE — ASSESSMENT & PLAN NOTE
Patient with recent cat bite to her right hand.  She saw hand surgeon and is status post I&D  She reports that overall the infection symptoms have resolved  However she continues to have limited range of motion of the right fifth digit with flexion and extension.  She is in no significant pain at this time.  I will refer her to hand specialist to further evaluate.
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
## 2024-02-12 RX ORDER — IBUPROFEN 600 MG/1
600 TABLET ORAL EVERY 6 HOURS PRN
Qty: 30 TABLET | Refills: 0 | Status: SHIPPED | OUTPATIENT
Start: 2024-02-12

## 2024-02-22 ENCOUNTER — OFFICE VISIT (OUTPATIENT)
Dept: SURGERY | Facility: CLINIC | Age: 29
End: 2024-02-22

## 2024-02-22 DIAGNOSIS — S61.401A OPEN WOUND OF RIGHT HAND WITHOUT FOREIGN BODY, UNSPECIFIED WOUND TYPE, INITIAL ENCOUNTER: Primary | ICD-10-CM

## 2024-02-22 PROCEDURE — 99204 OFFICE O/P NEW MOD 45 MIN: CPT | Performed by: PLASTIC SURGERY

## 2024-02-22 RX ORDER — ACETAMINOPHEN 160 MG
TABLET,DISINTEGRATING ORAL
COMMUNITY

## 2024-02-22 NOTE — H&P
Injury 1: R hand cat bite  - Date: 01/03/24  - Days Since: 50    Naomi Martinez is a 28 year old female that presents with     Chief Complaint:  Pain s/p R hand cat bite on 1/3/24    REFERRED BY:  Cody Saldana    Pacemaker: No  Latex Allergy: no  Coumadin: No  Plavix: No  Other anticoagulants: No  Diet medication: No  Cardiac stents: No    HAND DOMINANCE:  Right    Profession: [employer] and [employer]    RECONSTRUCTIVE HISTORY    SUN EXPOSURE   Current no   Past no   Sunburns yes   Tanning salons current no   Tanning salons past yes     SKIN CANCER    Personal history of skin cancer: none      HPI:       Injury 1: RH cat bite, seen 50 days postinjury  - Date: 01/03/24  - Days Since: 50      28-year-old female right-hand-dominant with right hand pain and stiffness    Cat bite, IV Unasyn for 3 days    Treated by Dr. Figueroa, I&D in office    Augmentin for 10 days    RSF stiffness      No drainage  Pain with certain maneuvers and with pressure      Review of Systems:   Constitutional: No change in appetite, chill/rigors, or fatigue  GI: No jaundice  Endocrine: No generalized weakness  Neurological: No aphasia, loss of consciousness, or seizures    Musculoskeletal:      Date of injury 1/3/24     Location right      small finger      Mechanism Cat bite     Treatment   1/3/24 Seen in IC, prescribed Augmentin, given tetanus  1/4/24 seen in ER symptoms worsened, xrays done, IV unasyn for 3 days  1/5/24 seen by Dr Figueroa, I&D in office, continued IV unasyn and oral Augmentin for 10 days       Pain  yes, intermittent, tender to touch, sharp, rates 3/10, takes ibuprofen as needed, is helpful     Numbness None    Pt here for FU due to continued pain/stiffness, limited ROM RSF MCP, PIP    Generalized edema RSF, erythema RSF MCP, healed wounds dorsal and volar RSF      PMH:     MEDICAL  Past Medical History:   Diagnosis Date    Asthma (HCC)     Deviated nasal septum 04/11/2022    GERD (gastroesophageal reflux  disease) 08/29/2023    Hypertrophy of nasal turbinates 04/11/2022    Seasonal allergies 08/29/2023        SURGICAL  History reviewed. No pertinent surgical history.     ALLERIGIES  Allergies   Allergen Reactions    Avocado DIARRHEA        MEDICATIONS  Current Outpatient Medications   Medication Sig Dispense Refill    cholecalciferol 50 MCG (2000 UT) Oral Cap Take by mouth. (Patient not taking: Reported on 2/22/2024)      ibuprofen 600 MG Oral Tab Take 1 tablet (600 mg total) by mouth every 6 (six) hours as needed for Pain. (Patient not taking: Reported on 2/22/2024) 30 tablet 0    lidocaine 4 % External Patch Place 1 patch onto the skin daily. (Patient not taking: Reported on 2/22/2024)      albuterol 108 (90 Base) MCG/ACT Inhalation Aero Soln Inhale 2 puffs into the lungs every 6 (six) hours as needed for Wheezing. (Patient not taking: Reported on 2/22/2024) 18 g 3    benzonatate 100 MG Oral Cap Take 1 capsule (100 mg total) by mouth 3 (three) times daily as needed for cough. (Patient not taking: Reported on 2/22/2024) 30 capsule 0        SOCIAL HISTORY  Social History     Socioeconomic History    Marital status: Single   Tobacco Use    Smoking status: Never    Smokeless tobacco: Never   Vaping Use    Vaping Use: Former    Substances: Nicotine, Flavoring   Substance and Sexual Activity    Alcohol use: Yes     Comment: Socially - 2 drinks once a month    Drug use: Never    Sexual activity: Yes     Partners: Male   Social History Narrative    Relationships:     Children:     Pets:     School:     Work: Works with animals    Origin:     Interests:     Spiritual:             FAMILY HISTORY  Family History   Problem Relation Age of Onset    Other (Rheumatoid arthritis) Mother     No Known Problems Father         Not in contact    Heart Disorder Maternal Grandmother         Heart failure    Diabetes Maternal Grandmother     No Known Problems Maternal Grandfather     No Known Problems Paternal Grandmother     No Known  Problems Paternal Grandfather     No Known Problems Half-Sister     Breast Cancer Maternal Aunt     Colon Cancer Neg     Crohn's Disease Neg     Ulcerative Colitis Neg           PHYSICAL EXAM:     CONSTITUTIONAL: Overall appearance - Normal  HEENT: Normocephalic  EYES: Conjunctiva - Right: Normal, Left: Normal; EOMI  EARS: Inspection - Right: Normal, Left: Normal  NECK/THYROID: Inspection - Normal, Palpation - Normal, Thyroid gland - Normal, No adenopathy  RESPIRATORY: Inspection - Normal, Effort - Normal  CARDIOVASCULAR: Regular rhythm, No murmurs  ABDOMEN: Inspection - Normal, No abdominal tenderness  NEURO: Memory intact  PSYCH: Oriented to person, place, time, and situation, Appropriate mood and affect      Hand Physical Exam:     Right hand healed scar    MP 20  PIP -20 to 45  DIP 45    MP tenderness    No intrinsic tightness    X-ray independently interpreted: No radiopaque foreign body    ASSESSMENT/PLAN:     RIGHT HAND STIFFNESS POST CAT BITE      OT for range of motion, ultrasound, fluidotherapy      2/22/2024  Emory Carpenter MD        +++++++++++++++++++++++++++++++++++++++++      MEDICAL DECISION MAKING    PROBLEMS      MODERATE    (number / complexity)          Acute complicated injury    DATA         MODERATE    (amount / complexity)          X-rays independently reviewed    MANAGEMENT RISK  LOW    (complications/ morbidity)       OT, modalities                  MDM LEVEL    MODERATE

## 2024-02-27 ENCOUNTER — APPOINTMENT (OUTPATIENT)
Dept: SURGERY | Facility: CLINIC | Age: 29
End: 2024-02-27

## 2024-02-27 DIAGNOSIS — M25.641 JOINT STIFFNESS OF HAND, RIGHT: ICD-10-CM

## 2024-02-27 DIAGNOSIS — M62.81 DISTAL MUSCLE WEAKNESS: Primary | ICD-10-CM

## 2024-02-27 PROCEDURE — 97166 OT EVAL MOD COMPLEX 45 MIN: CPT | Performed by: OCCUPATIONAL THERAPIST

## 2024-02-27 PROCEDURE — 97035 APP MDLTY 1+ULTRASOUND EA 15: CPT | Performed by: OCCUPATIONAL THERAPIST
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
## 2024-02-29 ENCOUNTER — OFFICE VISIT (OUTPATIENT)
Dept: SURGERY | Facility: CLINIC | Age: 29
End: 2024-02-29

## 2024-02-29 DIAGNOSIS — M62.81 DISTAL MUSCLE WEAKNESS: Primary | ICD-10-CM

## 2024-02-29 DIAGNOSIS — M25.641 JOINT STIFFNESS OF HAND, RIGHT: ICD-10-CM

## 2024-02-29 PROCEDURE — 97035 APP MDLTY 1+ULTRASOUND EA 15: CPT | Performed by: OCCUPATIONAL THERAPIST

## 2024-02-29 PROCEDURE — 97022 WHIRLPOOL THERAPY: CPT | Performed by: OCCUPATIONAL THERAPIST

## 2024-02-29 NOTE — PROGRESS NOTES
Subjective: My hand feels better.      Objective:     Current level of performance:  ADL: Independent  Work: [employer]  Leisure: Hiking    Measurements/Tests:  ROM:  Testing By: america  MP Small Right: 90  PIP Small Right: -20 / 90  DIP Small Right: 50  Edema Small Right: 210 degrees of MUELLER   Strength Right: 40 #      Strength Left: 40 #      Treatment Provided this day: Fluidotherapy to the right wrist and hand:: To increase active range of motion, reduce joint stiffness, as well as decrease scar sensitivity, for increased functional use of the involved extremity, during self care, work and or leisure time tasks. Ultrasound to the Right small finger scar sites: 8 min. Continuous 3.3 mhz w/cm squared. To reduce edema, increase circulation, soften scar tissue, for increased range of motion and reduction of pain. HEP    Treatment Time: 30 minutes:      Summary/Analysis of Treatment session: Patient has responded well to the fluidotherapy as well as ultrasound modality regime, Increased motion, decreased pain following x 2 OT visits.      Plan: Full use of the right hand in x 2 weeks:      Follow up in:  x 1 week.          Kris Marin OTR/AMANDA

## 2024-03-06 ENCOUNTER — OFFICE VISIT (OUTPATIENT)
Dept: SURGERY | Facility: CLINIC | Age: 29
End: 2024-03-06

## 2024-03-06 DIAGNOSIS — M62.81 DISTAL MUSCLE WEAKNESS: Primary | ICD-10-CM

## 2024-03-06 DIAGNOSIS — M25.641 JOINT STIFFNESS OF HAND, RIGHT: ICD-10-CM

## 2024-03-06 PROCEDURE — 97022 WHIRLPOOL THERAPY: CPT | Performed by: OCCUPATIONAL THERAPIST

## 2024-03-06 PROCEDURE — 97035 APP MDLTY 1+ULTRASOUND EA 15: CPT | Performed by: OCCUPATIONAL THERAPIST

## 2024-03-06 NOTE — PROGRESS NOTES
Subjective: The scar on my RSF is very sensitive.      Objective:     Current level of performance:  ADL: Independent  Work: No restrictions  Leisure: Family    Measurements/Tests:  ROM:  Testing By: america  MP Small Right: 90  PIP Small Right: -10 / 90  DIP Small Right: 50  Edema Small Right: 220 degrees of MUELLER            Treatment Provided this day: Fluidotherapy to the right wrist and hand: To increase active range of motion, reduce joint stiffness, as well as decrease scar sensitivity, for increased functional use of the involved extremity, during self care, work and or leisure time tasks. Ultrasound to the RSF ulnar border scar site: 8 min. Continuous 3.3 mhz w/cm squared. To reduce edema, increase circulation, soften scar tissue, for increased range of motion and reduction of pain. HEP     Treatment Time: 30 minutes      Summary/Analysis of Treatment session: Progressing well with OT goals and objectives.      Plan: Full use of the right hand in x 3 weeks:      Follow up in:  03/11/2024          Kris BILLINGS/AMANDA

## 2024-03-11 ENCOUNTER — OFFICE VISIT (OUTPATIENT)
Dept: SURGERY | Facility: CLINIC | Age: 29
End: 2024-03-11

## 2024-03-11 DIAGNOSIS — M62.81 DISTAL MUSCLE WEAKNESS: Primary | ICD-10-CM

## 2024-03-11 DIAGNOSIS — M25.641 JOINT STIFFNESS OF HAND, RIGHT: ICD-10-CM

## 2024-03-11 PROCEDURE — 97022 WHIRLPOOL THERAPY: CPT | Performed by: OCCUPATIONAL THERAPIST

## 2024-03-11 PROCEDURE — 97035 APP MDLTY 1+ULTRASOUND EA 15: CPT | Performed by: OCCUPATIONAL THERAPIST

## 2024-03-11 NOTE — PROGRESS NOTES
Subjective: I am feeling a little better.      Objective:     Current level of performance:  ADL: Independent  Work: No restrictions  Leisure: Hiking    Measurements/Tests:  ROM:  Testing By: america  MP Small Right: 90  PIP Small Right: 80  DIP Small Right: 40  Edema Small Right: 210 degrees of MUELLER            Treatment Provided this day: Fluidotherapy to the right wrist and hand: To increase active range of motion, reduce joint stiffness, as well as decrease scar sensitivity, for increased functional use of the involved extremity, during self care, work and or leisure time tasks. Ultrasound to the RSF, Ulnar Border scar sites: 8 min. Continuous 3.3 mhz w/cm squared. To reduce edema, increase circulation, soften scar tissue, for increased range of motion and reduction of pain. Desensitization techniques and HEP were reviewed.    Treatment Time: 30 minutes      Summary/Analysis of Treatment session: Progressing well with OT goals and objectives.      Plan: full use of the right hand in x 3 weeks.      Follow up in:  03/13/2024.          Kris BILLINGS/AMANDA

## 2024-03-13 ENCOUNTER — OFFICE VISIT (OUTPATIENT)
Dept: SURGERY | Facility: CLINIC | Age: 29
End: 2024-03-13

## 2024-03-13 DIAGNOSIS — M62.81 DISTAL MUSCLE WEAKNESS: Primary | ICD-10-CM

## 2024-03-13 DIAGNOSIS — M25.641 JOINT STIFFNESS OF HAND, RIGHT: ICD-10-CM

## 2024-03-13 PROCEDURE — 97022 WHIRLPOOL THERAPY: CPT | Performed by: OCCUPATIONAL THERAPIST

## 2024-03-13 PROCEDURE — 97035 APP MDLTY 1+ULTRASOUND EA 15: CPT | Performed by: OCCUPATIONAL THERAPIST

## 2024-03-13 NOTE — PROGRESS NOTES
Subjective: I feel much better.      Objective:     Current level of performance:  ADL: Independent  Work:   Leisure: Hiking    Measurements/Tests:  ROM:         N/A         Treatment Provided this day: Fluidotherapy to the right wrist and hand: To increase active range of motion, reduce joint stiffness, as well as decrease scar sensitivity, for increased functional use of the involved extremity, during self care, work and or leisure time tasks. Ultrasound to the RSF / Ulnar border scar site: 8 min. Continuous 3.3 mhz w/cm squared. To reduce edema, increase circulation, soften scar tissue, for increased range of motion and reduction of pain. HEP    Treatment Time: 30 minutes      Summary/Analysis of Treatment session: Progressing well with OT goals and objectives.      Plan: Full functional use of the right hand in x 2 weeks:      Follow up in:  x 1 week.          Kris Marin  OTR/AMANDA

## 2024-03-18 ENCOUNTER — OFFICE VISIT (OUTPATIENT)
Dept: SURGERY | Facility: CLINIC | Age: 29
End: 2024-03-18

## 2024-03-18 DIAGNOSIS — M25.641 JOINT STIFFNESS OF HAND, RIGHT: ICD-10-CM

## 2024-03-18 DIAGNOSIS — M62.81 DISTAL MUSCLE WEAKNESS: Primary | ICD-10-CM

## 2024-03-18 PROCEDURE — 97022 WHIRLPOOL THERAPY: CPT | Performed by: OCCUPATIONAL THERAPIST

## 2024-03-18 PROCEDURE — 97035 APP MDLTY 1+ULTRASOUND EA 15: CPT | Performed by: OCCUPATIONAL THERAPIST

## 2024-03-18 NOTE — PROGRESS NOTES
Subjective: I am feeling better.      Objective:     Current level of performance:  ADL: Independent  Work: .  Leisure: Not reviewed    Measurements/Tests:  ROM:      N/A            Treatment Provided this day: Fluidotherapy to the right wrist and hand: To increase active range of motion, reduce joint stiffness, as well as decrease scar sensitivity, for increased functional use of the involved extremity, during self care, work and or leisure time tasks. Ultrasound to the RSF /  Ulnar Boarder scar site:8 min. Continuous 3.3 mhz w/cm squared. To reduce edema, increase circulation, soften scar tissue, for increased range of motion and reduction of pain. HEP...Desensitization techniques.    Treatment Time: 30 minutes      Summary/Analysis of Treatment session: Progressing well with OT goals and objectives.      Plan: Full functional use of the right hand in x 3 weeks:        Follow up in:  03/20/2024.          Kris BILLINGS/AMANDA

## 2024-03-27 ENCOUNTER — OFFICE VISIT (OUTPATIENT)
Dept: SURGERY | Facility: CLINIC | Age: 29
End: 2024-03-27

## 2024-03-27 DIAGNOSIS — M62.81 DISTAL MUSCLE WEAKNESS: Primary | ICD-10-CM

## 2024-03-27 DIAGNOSIS — M25.641 JOINT STIFFNESS OF HAND, RIGHT: ICD-10-CM

## 2024-03-27 PROCEDURE — 97022 WHIRLPOOL THERAPY: CPT | Performed by: OCCUPATIONAL THERAPIST

## 2024-03-27 PROCEDURE — 97035 APP MDLTY 1+ULTRASOUND EA 15: CPT | Performed by: OCCUPATIONAL THERAPIST

## 2024-03-27 NOTE — PROGRESS NOTES
Subjective: I still have discomfort of my RSF.      Objective:     Current level of performance:  ADL: Independent  Work: No restrictions  Leisure: Pets    Measurements/Tests:  ROM:      N/A            Treatment Provided this day: Fluidotherapy to the right wrist and hand: To increase active range of motion, reduce joint stiffness, as well as decrease scar sensitivity, for increased functional use of the involved extremity, during self care, work and or leisure time tasks. Ultrasound to the RSF scar site: 8 min. Continuous 3.3 mhz w/cm squared. To reduce edema, increase circulation, soften scar tissue, for increased range of motion and reduction of pain.    Treatment Time: 30 minutes      Summary/Analysis of Treatment session: Patient continues to have complaints of RSF scar site discomfort.      Plan: Full use of the right hand in c 2 weeks.      Follow up in:  04/04/2024          Kris Marin  OTR/AMANDA

## 2024-04-11 ENCOUNTER — OFFICE VISIT (OUTPATIENT)
Dept: SURGERY | Facility: CLINIC | Age: 29
End: 2024-04-11

## 2024-04-11 DIAGNOSIS — M25.641 JOINT STIFFNESS OF HAND, RIGHT: ICD-10-CM

## 2024-04-11 DIAGNOSIS — M62.81 DISTAL MUSCLE WEAKNESS: Primary | ICD-10-CM

## 2024-04-11 PROCEDURE — 97110 THERAPEUTIC EXERCISES: CPT | Performed by: OCCUPATIONAL THERAPIST

## 2024-04-11 NOTE — PROGRESS NOTES
Subjective: I feel much better.      Objective:     Current level of performance:  ADL: Independent  Work: No restrictions  Leisure: Pets    Measurements/Tests:  ROM:  Testing By: america   Strength Right: 55 #      Strength Left: 50 #      Treatment Provided this day: Up dated bilateral hand strength measurements: Reviewed HEP as previously documented. Physician follow-up.    Treatment Time: 20 minutes      Summary/Analysis of Treatment session: No further OT needs at this time.      Plan: Discontinue OT      Follow up in:  To call with questions and or concerns.          Kris Marin  OTR/L

## 2024-06-25 ENCOUNTER — OFFICE VISIT (OUTPATIENT)
Dept: FAMILY MEDICINE CLINIC | Facility: CLINIC | Age: 29
End: 2024-06-25

## 2024-06-25 VITALS
TEMPERATURE: 97 F | HEART RATE: 68 BPM | SYSTOLIC BLOOD PRESSURE: 112 MMHG | DIASTOLIC BLOOD PRESSURE: 74 MMHG | WEIGHT: 156.38 LBS | RESPIRATION RATE: 20 BRPM | BODY MASS INDEX: 26.7 KG/M2 | OXYGEN SATURATION: 100 % | HEIGHT: 64 IN

## 2024-06-25 DIAGNOSIS — R09.82 POST-NASAL DRIP: ICD-10-CM

## 2024-06-25 DIAGNOSIS — R22.32 AXILLARY LUMP, LEFT: ICD-10-CM

## 2024-06-25 DIAGNOSIS — R05.8 POST-VIRAL COUGH SYNDROME: Primary | ICD-10-CM

## 2024-06-25 PROCEDURE — 99214 OFFICE O/P EST MOD 30 MIN: CPT | Performed by: PHYSICIAN ASSISTANT

## 2024-06-25 RX ORDER — DOXYCYCLINE HYCLATE 100 MG/1
100 CAPSULE ORAL 2 TIMES DAILY
Qty: 14 CAPSULE | Refills: 0 | Status: SHIPPED | OUTPATIENT
Start: 2024-06-25 | End: 2024-07-02

## 2024-06-25 NOTE — PROGRESS NOTES
CHIEF COMPLAINT:     Chief Complaint   Patient presents with    Cough     2 weeks w/ sore throat, cough, and runny nose. 2 days w/ ago armpit bump (left)        HPI:   Naomi Martinez is a 29 year old female who presents for upper respiratory symptoms for  2 weeks. Patient reports  runny nose, lots of PND, acute on chronic cough . Symptoms have been improved overall since onset.  Mainly concerned about the constant PND and cough.  Treating symptoms with Benzonatate sparingly.  Mucinex regularly.  No HA, nasal congestion, sinus congestion/pain.  No dizziness.  Good PO intake. No fevers.  No wheezing/SOB.     Patient also has concerns about bump in left axillary region, noticed 2 days ago, mildly reddened.  Feels like it may be slightly smaller today. No hx of MRSA. .      Current Outpatient Medications   Medication Sig Dispense Refill    cholecalciferol 50 MCG (2000 UT) Oral Cap Take by mouth.      ibuprofen 600 MG Oral Tab Take 1 tablet (600 mg total) by mouth every 6 (six) hours as needed for Pain. 30 tablet 0    albuterol 108 (90 Base) MCG/ACT Inhalation Aero Soln Inhale 2 puffs into the lungs every 6 (six) hours as needed for Wheezing. 18 g 3    benzonatate 100 MG Oral Cap Take 1 capsule (100 mg total) by mouth 3 (three) times daily as needed for cough. 30 capsule 0      Past Medical History:    Asthma (HCC)    Deviated nasal septum    GERD (gastroesophageal reflux disease)    Hypertrophy of nasal turbinates    Seasonal allergies      History reviewed. No pertinent surgical history.      Social History     Socioeconomic History    Marital status: Single   Tobacco Use    Smoking status: Never    Smokeless tobacco: Never   Vaping Use    Vaping status: Former    Substances: Nicotine, Flavoring   Substance and Sexual Activity    Alcohol use: Yes     Comment: Socially - 2 drinks once a month    Drug use: Never    Sexual activity: Yes     Partners: Male   Social History Narrative    Relationships:     Children:      Pets:     School:     Work: Works with animals    Origin:     Interests:     Spiritual:          Social Determinants of Health     Physical Activity: Inactive (9/14/2020)    Received from [employer], [employer]    Exercise Vital Sign     Days of Exercise per Week: 0 days     Minutes of Exercise per Session: 0 min         REVIEW OF SYSTEMS:   GENERAL:  see HPI  SKIN:  see above  HEENT: See HPI  LUNGS: See HPI  CARDIOVASCULAR: denies chest pain or palpitations   GI: denies N/V/C or abdominal pain      EXAM:   /74   Pulse 68   Temp 97.1 °F (36.2 °C)   Resp 20   Ht 5' 4\" (1.626 m)   Wt 156 lb 6.4 oz (70.9 kg)   LMP 06/04/2024 (Exact Date)   SpO2 100%   BMI 26.85 kg/m²   GENERAL: well developed, well nourished,in no apparent distress  SKIN: grape sized nodulocytic lump in left axilla, slight erythema overlying, mildly tender to palpation, non fluctuant  HEAD: atraumatic, normocephalic.  No tenderness on palpation of frontal or maxillary sinuses  EYES: conjunctiva clear, EOM intact  EARS: TM's non injected, no bulging, no retraction,no fluid, bony landmarks visible  NOSE: Nostrils patent, no nasal discharge, nasal mucosa mildly inflamed, deep pink   THROAT: Oral mucosa pink, moist. Posterior pharynx is non erythematous. NO exudates. Tonsils 2/4 on left, 1/4 on right.     NECK: Supple, non-tender  LUNGS: clear to auscultation bilaterally, no wheezes or rhonchi. Breathing is non labored.  CARDIO: RRR without murmur  EXTREMITIES: no cyanosis, clubbing or edema  LYMPH:  No cervical or submandibular lymphadenopathy.            ASSESSMENT AND PLAN:   Naomi Martinez is a 29 year old female who presents with upper respiratory symptoms that are consistent with    ASSESSMENT:   Encounter Diagnoses   Name Primary?    Post-viral cough syndrome Yes    Post-nasal drip     Axillary lump, left        PLAN: Educated on post viral cough/PND, stop Mucinex.  Start antihistamine instead.  Advised  concern for early abscess in axilla, definitive tx would be I&D at IC.  Patient has to go to work in 2 hours and would like to trial oral antibiotic and warm compresses first.  If not improving or for worsening sx to go to [facility].  Address provided to patient. Meds as below.  Comfort care as described in Patient Instructions    Meds & Refills for this Visit:  Requested Prescriptions     Signed Prescriptions Disp Refills    doxycycline 100 MG Oral Cap 14 capsule 0     Sig: Take 1 capsule (100 mg total) by mouth 2 (two) times daily for 7 days.     Risks, benefits, and side effects of medication explained and discussed.    The patient indicates understanding of these issues and agrees to the plan.  The patient is asked to f/u with PCP if sx's persist or worsen.

## 2024-07-22 ENCOUNTER — OFFICE VISIT (OUTPATIENT)
Dept: INTERNAL MEDICINE CLINIC | Facility: CLINIC | Age: 29
End: 2024-07-22
Payer: MEDICAID

## 2024-07-22 VITALS
DIASTOLIC BLOOD PRESSURE: 68 MMHG | WEIGHT: 156 LBS | HEIGHT: 64 IN | BODY MASS INDEX: 26.63 KG/M2 | RESPIRATION RATE: 15 BRPM | OXYGEN SATURATION: 98 % | SYSTOLIC BLOOD PRESSURE: 110 MMHG

## 2024-07-22 DIAGNOSIS — J35.1 ENLARGED TONSILS: ICD-10-CM

## 2024-07-22 DIAGNOSIS — E66.3 OVERWEIGHT (BMI 25.0-29.9): ICD-10-CM

## 2024-07-22 DIAGNOSIS — J30.2 SEASONAL ALLERGIES: ICD-10-CM

## 2024-07-22 DIAGNOSIS — R07.89 CHEST WALL PAIN: ICD-10-CM

## 2024-07-22 DIAGNOSIS — J34.2 DEVIATED NASAL SEPTUM: ICD-10-CM

## 2024-07-22 DIAGNOSIS — K21.9 GASTROESOPHAGEAL REFLUX DISEASE, UNSPECIFIED WHETHER ESOPHAGITIS PRESENT: ICD-10-CM

## 2024-07-22 DIAGNOSIS — G44.89 OTHER HEADACHE SYNDROME: ICD-10-CM
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
## 2024-07-22 DIAGNOSIS — Z00.00 HEALTH MAINTENANCE EXAMINATION: Primary | ICD-10-CM

## 2024-07-22 DIAGNOSIS — J34.3 HYPERTROPHY OF NASAL TURBINATES: ICD-10-CM

## 2024-07-22 DIAGNOSIS — J45.990 EXERCISE-INDUCED BRONCHOSPASM (HCC): ICD-10-CM
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
## 2024-07-22 PROBLEM — R25.2 MUSCLE CRAMP: Status: RESOLVED | Noted: 2024-02-06 | Resolved: 2024-07-22

## 2024-07-22 PROBLEM — H69.92 DYSFUNCTION OF LEFT EUSTACHIAN TUBE: Status: RESOLVED | Noted: 2023-08-29 | Resolved: 2024-07-22

## 2024-07-22 LAB
HBV SURFACE AB SER QL: NONREACTIVE
HBV SURFACE AB SERPL IA-ACNC: <3.1 MIU/ML
RUBV IGG SER QL: POSITIVE
RUBV IGG SER-ACNC: 98 IU/ML (ref 10–?)

## 2024-07-22 PROCEDURE — 86787 VARICELLA-ZOSTER ANTIBODY: CPT | Performed by: FAMILY MEDICINE

## 2024-07-22 PROCEDURE — 86765 RUBEOLA ANTIBODY: CPT | Performed by: FAMILY MEDICINE

## 2024-07-22 PROCEDURE — 86480 TB TEST CELL IMMUN MEASURE: CPT | Performed by: FAMILY MEDICINE

## 2024-07-22 PROCEDURE — 86735 MUMPS ANTIBODY: CPT | Performed by: FAMILY MEDICINE

## 2024-07-22 PROCEDURE — 99395 PREV VISIT EST AGE 18-39: CPT | Performed by: FAMILY MEDICINE

## 2024-07-22 PROCEDURE — 87389 HIV-1 AG W/HIV-1&-2 AB AG IA: CPT | Performed by: FAMILY MEDICINE

## 2024-07-22 PROCEDURE — 86803 HEPATITIS C AB TEST: CPT | Performed by: FAMILY MEDICINE

## 2024-07-22 PROCEDURE — 86762 RUBELLA ANTIBODY: CPT | Performed by: FAMILY MEDICINE

## 2024-07-22 PROCEDURE — 86706 HEP B SURFACE ANTIBODY: CPT | Performed by: FAMILY MEDICINE

## 2024-07-22 NOTE — ASSESSMENT & PLAN NOTE
Patient with a history of exercise-induced bronchospasm.  Has had PFTs done in the past which were normal.  Uses albuterol as needed

## 2024-07-22 NOTE — PATIENT INSTRUCTIONS
PATIENT INSTRUCTIONS    Thank you for seeing me today, it was a pleasure taking care of you.  Please check out at the  and schedule a follow up appointment.  Return in about 1 year (around 7/22/2025) for physical .  Please remember that the jennifer period for all appointments is 5 minutes. This is to help maximize the amount of time that we can spend together at our visits.    Please get your labs drawn - you may need to schedule a lab appointment if this was not completed at your recent doctor's visit.  The following imaging studies were ordered: None  Please also follow up with the following specialists: Neurology, oral surgeon - wisdom teeth  Please fill out the advance directive information (power of  documents) and bring a copy to our clinic.  Focus on a healthy diet and exercise      Dr. Les Turk

## 2024-07-22 NOTE — ASSESSMENT & PLAN NOTE
Patient with a history of cat bite to her right hand.  She saw hand surgeon and is status post I&D  Saw a hand specialist for follow up afterward, also completed occupational therapy  Doing well  Intermittent pain when touching certain spots, but overall improved and no longer bothersome

## 2024-07-22 NOTE — ASSESSMENT & PLAN NOTE
Exercise and diet advised.  HIV screen, hepatitis C screen, MMR and varicella titer, Hep B Sab, quantiferon  Advanced directive information provided.  Advised COVID vaccine.  Monitor mental health, if worsening come and seem me to further assess

## 2024-07-22 NOTE — ASSESSMENT & PLAN NOTE
Patient with a history of deviated septum and hypertrophy of nasal turbinates  Can follow with ENT again in the future if she desires.

## 2024-07-22 NOTE — ASSESSMENT & PLAN NOTE
Patient with a history of frequent headaches.  Unclear if migraines, can also consider trigeminal neuralgia.   She wonders if this is related to her wisdom teeth as well  advised seeing a surgeon for this.   Advised to use ibuprofen as needed.  Will refer to neurology to further evaluate.  If migrainous, consider prophylactic migraine medication
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
## 2024-07-23 LAB — HCV AB SERPL QL IA: NONREACTIVE

## 2024-07-24 ENCOUNTER — TELEPHONE (OUTPATIENT)
Dept: INTERNAL MEDICINE CLINIC | Facility: CLINIC | Age: 29
End: 2024-07-24

## 2024-07-24 DIAGNOSIS — Z23 NEED FOR HEPATITIS B VACCINATION: Primary | ICD-10-CM

## 2024-07-24 LAB
M TB IFN-G CD4+ T-CELLS BLD-ACNC: 0 IU/ML
M TB TUBERC IFN-G BLD QL: NEGATIVE
M TB TUBERC IGNF/MITOGEN IGNF CONTROL: >10 IU/ML
MEV IGG SER-ACNC: 34.2 AU/ML (ref 16.5–?)
MUV IGG SER IA-ACNC: 207 AU/ML (ref 11–?)
QFT TB1 AG MINUS NIL: 0.04 IU/ML
QFT TB2 AG MINUS NIL: 0.03 IU/ML
VZV IGG SER IA-ACNC: 619.9 (ref 165–?)

## 2024-08-01 ENCOUNTER — NURSE ONLY (OUTPATIENT)
Dept: INTERNAL MEDICINE CLINIC | Facility: CLINIC | Age: 29
End: 2024-08-01
Payer: MEDICAID

## 2024-08-01 PROCEDURE — 90471 IMMUNIZATION ADMIN: CPT | Performed by: FAMILY MEDICINE

## 2024-08-01 PROCEDURE — 90746 HEPB VACCINE 3 DOSE ADULT IM: CPT | Performed by: FAMILY MEDICINE

## 2024-08-01 NOTE — PROGRESS NOTES
Subjective:   Patient ID: Naomi Martinez is a 29 year old female.    HPI    History/Other:   Review of Systems  Current Outpatient Medications   Medication Sig Dispense Refill    cholecalciferol 50 MCG (2000 UT) Oral Cap Take by mouth.      albuterol 108 (90 Base) MCG/ACT Inhalation Aero Soln Inhale 2 puffs into the lungs every 6 (six) hours as needed for Wheezing. 18 g 3     Allergies:  Allergies   Allergen Reactions    Avocado DIARRHEA       Objective:   Physical Exam    Assessment & Plan:   No diagnosis found.    No orders of the defined types were placed in this encounter.      Meds This Visit:  Requested Prescriptions      No prescriptions requested or ordered in this encounter       Imaging & Referrals:  None

## 2024-08-24 ENCOUNTER — PATIENT MESSAGE (OUTPATIENT)
Dept: INTERNAL MEDICINE CLINIC | Facility: CLINIC | Age: 29
End: 2024-08-24

## 2024-08-24 DIAGNOSIS — Z00.00 HEALTH MAINTENANCE EXAMINATION: Primary | ICD-10-CM

## 2024-08-26 NOTE — TELEPHONE ENCOUNTER
From: Naomi Martinez  To: Cody Saldana  Sent: 8/24/2024 2:42 PM CDT  Subject: Follow up titer    Hi Dr Saldana, hope all is going well!   It looks like I need a follow up titer for Hep B.   I know after receiving the vaccine I should wait four weeks. It’ll be four weeks this upcoming Thursday. Would it be possible to come in for that? Thank you!

## 2024-08-26 NOTE — TELEPHONE ENCOUNTER
Discussed with patient option of finishing vaccine series or getting blood drawn to evaluate for immunity. She prefers to get hep B Sab checked at this time. Will order. If still nonreactive will need additional vaccines.

## 2024-09-11 ENCOUNTER — LAB ENCOUNTER (OUTPATIENT)
Dept: LAB | Facility: REFERENCE LAB | Age: 29
End: 2024-09-11
Attending: FAMILY MEDICINE
Payer: MEDICAID

## 2024-09-11 ENCOUNTER — TELEPHONE (OUTPATIENT)
Dept: INTERNAL MEDICINE CLINIC | Facility: CLINIC | Age: 29
End: 2024-09-11

## 2024-09-11 DIAGNOSIS — Z00.00 HEALTH MAINTENANCE EXAMINATION: ICD-10-CM

## 2024-09-11 LAB
HBV SURFACE AB SER QL: REACTIVE
HBV SURFACE AB SERPL IA-ACNC: 31.35 MIU/ML

## 2024-09-11 PROCEDURE — 86706 HEP B SURFACE ANTIBODY: CPT

## 2024-09-11 PROCEDURE — 36415 COLL VENOUS BLD VENIPUNCTURE: CPT

## 2024-09-11 NOTE — TELEPHONE ENCOUNTER
Patient called and left a voicemail.  She needs to have a Hep B Titer done.  Dr. Saldana said this can't be done here at the clinic but there are other places she can go to have this done.    Please call and let her know where she can have this done, preferrably she said in the message close to [place].

## 2024-10-17 ENCOUNTER — OFFICE VISIT (OUTPATIENT)
Dept: FAMILY MEDICINE CLINIC | Facility: CLINIC | Age: 29
End: 2024-10-17
Payer: MEDICAID

## 2024-10-17 DIAGNOSIS — Z23 NEED FOR INFLUENZA VACCINATION: Primary | ICD-10-CM

## 2024-10-17 PROCEDURE — 90471 IMMUNIZATION ADMIN: CPT | Performed by: NURSE PRACTITIONER

## 2024-10-17 PROCEDURE — 90656 IIV3 VACC NO PRSV 0.5 ML IM: CPT | Performed by: NURSE PRACTITIONER

## 2024-10-17 NOTE — PROGRESS NOTES
Presents for flu shot.  Vaccination Screening Questionnaire filled out by patient. Form was reviewed by health care provider then scanned into chart.   No contraindications to vaccination.  Vaccination information sheet given.  Side effects and risks of vaccination explained and discussed.  Pt voiced understanding and agreed to proceed with vaccination.  Vaccination given and pt tolerated well.

## 2025-01-07 ENCOUNTER — OFFICE VISIT (OUTPATIENT)
Dept: FAMILY MEDICINE CLINIC | Facility: CLINIC | Age: 30
End: 2025-01-07
Payer: MEDICAID

## 2025-01-07 VITALS
WEIGHT: 161 LBS | HEIGHT: 64 IN | TEMPERATURE: 99 F | HEART RATE: 103 BPM | BODY MASS INDEX: 27.49 KG/M2 | OXYGEN SATURATION: 98 % | SYSTOLIC BLOOD PRESSURE: 114 MMHG | DIASTOLIC BLOOD PRESSURE: 64 MMHG | RESPIRATION RATE: 18 BRPM

## 2025-01-07 DIAGNOSIS — J06.9 VIRAL URI WITH COUGH: Primary | ICD-10-CM

## 2025-01-07 DIAGNOSIS — Z76.0 ENCOUNTER FOR MEDICATION REFILL: ICD-10-CM

## 2025-01-07 DIAGNOSIS — J02.9 SORE THROAT: ICD-10-CM

## 2025-01-07 LAB
CONTROL LINE PRESENT WITH A CLEAR BACKGROUND (YES/NO): YES YES/NO
KIT LOT #: NORMAL NUMERIC
STREP GRP A CUL-SCR: NEGATIVE

## 2025-01-07 PROCEDURE — 87637 SARSCOV2&INF A&B&RSV AMP PRB: CPT | Performed by: NURSE PRACTITIONER

## 2025-01-07 PROCEDURE — 87880 STREP A ASSAY W/OPTIC: CPT | Performed by: NURSE PRACTITIONER

## 2025-01-07 PROCEDURE — 99213 OFFICE O/P EST LOW 20 MIN: CPT | Performed by: NURSE PRACTITIONER

## 2025-01-07 RX ORDER — BENZONATATE 200 MG/1
200 CAPSULE ORAL 3 TIMES DAILY PRN
Qty: 15 CAPSULE | Refills: 0 | Status: SHIPPED | OUTPATIENT
Start: 2025-01-07 | End: 2025-01-12

## 2025-01-07 RX ORDER — AZELASTINE HYDROCHLORIDE 137 UG/1
SPRAY, METERED NASAL
COMMUNITY

## 2025-01-07 RX ORDER — ALBUTEROL SULFATE 90 UG/1
2 INHALANT RESPIRATORY (INHALATION) EVERY 4 HOURS PRN
Qty: 1 EACH | Refills: 0 | Status: SHIPPED | OUTPATIENT
Start: 2025-01-07

## 2025-01-07 NOTE — PROGRESS NOTES
CHIEF COMPLAINT:     Chief Complaint   Patient presents with    Cough     4 days w/ cough, runny nose, headache, body aches, dizziness, fatigue and sore throat.        HPI:   Naomi Martinez is a 29 year old female who presents for upper respiratory symptoms for  4 days. Patient reports sore throat, rhinorrhea, cough, body aches, dizziness, fatigue, and chest tightness . Symptoms have been slightly improved since onset.  Treating symptoms with albuterol inhaler - helping and ibuprofen.   Associated symptoms include see above.    Sick contacts - pneumonia.     Eating and drinking well.     Current Outpatient Medications   Medication Sig Dispense Refill    azelastine 137 MCG/SPRAY Nasal Solution INSTILL 2 SPRAYS IN EACH NOSTRIL NASALLY TWICE A DAY AS NEEDED 30 DAY(S) for 90      benzonatate 200 MG Oral Cap Take 1 capsule (200 mg total) by mouth 3 (three) times daily as needed for cough. 15 capsule 0    albuterol 108 (90 Base) MCG/ACT Inhalation Aero Soln Inhale 2 puffs into the lungs every 4 (four) hours as needed for Wheezing or Shortness of Breath. 1 each 0    cholecalciferol 50 MCG (2000 UT) Oral Cap Take by mouth.      albuterol 108 (90 Base) MCG/ACT Inhalation Aero Soln Inhale 2 puffs into the lungs every 6 (six) hours as needed for Wheezing. 18 g 3      Past Medical History:    Asthma (HCC)    Deviated nasal septum    GERD (gastroesophageal reflux disease)    Hypertrophy of nasal turbinates    Seasonal allergies      History reviewed. No pertinent surgical history.      Social History     Socioeconomic History    Marital status: Single   Tobacco Use    Smoking status: Never     Passive exposure: Never    Smokeless tobacco: Never   Vaping Use    Vaping status: Former    Substances: Nicotine, Flavoring   Substance and Sexual Activity    Alcohol use: Yes     Comment: Socially - 2 drinks once a month    Drug use: Never    Sexual activity: Yes     Partners: Male   Social History Narrative    Relationships: Single     Children: None    Pets: 2 Cats    School: [employer]     Work: Works with animals - reception and tech    Origin: From [facility]. Mostly [place] background.     Interests: Enjoys walking, nature, horseback riding, video game - PS4    Spiritual: Restoration - not practicing.      Social Drivers of Health     Physical Activity: Inactive (9/14/2020)    [facility] from [facility]    Exercise Vital Sign     Days of Exercise per Week: 0 days     Minutes of Exercise per Session: 0 min         REVIEW OF SYSTEMS:   GENERAL: good appetite  SKIN: no rashes or abnormal skin lesions  HEENT: See HPI. No ear pain.   LUNGS: denies shortness of breath or wheezing, See HPI  CARDIOVASCULAR: denies chest pain  GI: denies N/V/C or abdominal pain; diarrhea once.   NEURO: +  headaches    EXAM:   /64   Pulse 103   Temp 98.6 °F (37 °C)   Resp 18   Ht 5' 4\" (1.626 m)   Wt 161 lb (73 kg)   LMP 12/10/2024 (Exact Date)   SpO2 98%   BMI 27.64 kg/m²   GENERAL: well developed, well nourished,in no apparent distress  SKIN: no rashes,no suspicious lesions  HEAD: atraumatic, normocephalic.  No tenderness on palpation of  sinuses  EYES: conjunctiva clear, EOM intact  EARS: TM's gray, no bulging, no retraction,no fluid, bony landmarks visible  NOSE: Nostrils patent, clear nasal discharge, nasal mucosa pink   THROAT: Oral mucosa pink, moist. Posterior pharynx is not erythematous. no exudates. Tonsils 1/4.    NECK: Supple, non-tender  LUNGS: clear to auscultation bilaterally, no wheezes or rhonchi. Breathing is non labored.  CARDIO: Slightly elevated HR, regular and without murmur  EXTREMITIES: no cyanosis, clubbing or edema  LYMPH:  no lymphadenopathy.      Recent Results (from the past week)   Strep A Assay W/Optic    Collection Time: 01/07/25  6:30 PM   Result Value Ref Range    Strep Grp A Screen negative Negative    Control Line Present with a clear background (yes/no) yes Yes/No    Kit Lot  # [id number] Numeric    Kit Expiration Date 11/13/25 Date         ASSESSMENT AND PLAN:   Naomi Martinez is a 29 year old female who presents with upper respiratory symptoms that are consistent with    ASSESSMENT:   Encounter Diagnoses   Name Primary?    Sore throat     Viral URI with cough Yes    Encounter for medication refill        PLAN: Meds as below.  Comfort care as described in Patient Instructions    Meds & Refills for this Visit:  Requested Prescriptions     Signed Prescriptions Disp Refills    benzonatate 200 MG Oral Cap 15 capsule 0     Sig: Take 1 capsule (200 mg total) by mouth 3 (three) times daily as needed for cough.    albuterol 108 (90 Base) MCG/ACT Inhalation Aero Soln 1 each 0     Sig: Inhale 2 puffs into the lungs every 4 (four) hours as needed for Wheezing or Shortness of Breath.     Rapid strep is negative.     Quad resp panel sent.     Rx albuterol inhaler. Pt aware how to use.     Rx benzonatate. Discussed with patient that this medication may make her dizzy or drowsy. Do not take with other cough medications.     Discussed s/s of worsening infection/condition with Patient and importance of prompt medical re-evaluation including when to seek emergency care. Patient  voiced understanding    Increase fluids and rest. Humidified air. Warm steamy showers.     May consider OTC tylenol or ibuprofen as needed and directed on packaging for pain/fever    May consider OTC guaifenesin as needed and directed on packaging to thin mucus secretions.    May consider OTC dextromethorphan as needed and directed on packaging as a cough suppressant if not using benzonatate.     May consider OTC pseudoephedrine as needed and directed on packaging as a nasal decongestant    May consider OTC Cepacol throat lozenges as needed and directed on packaging for sore throat.     Risks, benefits, and side effects of medication discussed. Patient  verbalized understanding and agreement with treatment plan.     All  questions and concerns addressed. Encouraged Patient  to call clinic with any questions or concerns. I explained to the patient that emergent conditions may arise and to go to the ER for new, worsening or any persistent conditions.    Patient Instructions   See attached patient care instructions.      The patient indicates understanding of these issues and agrees to the plan.  The patient is asked to return if sx's persist or worsen.

## 2025-01-08 LAB
FLUAV + FLUBV RNA SPEC NAA+PROBE: NOT DETECTED
FLUAV + FLUBV RNA SPEC NAA+PROBE: NOT DETECTED
RSV RNA SPEC NAA+PROBE: NOT DETECTED
SARS-COV-2 RNA RESP QL NAA+PROBE: NOT DETECTED

## 2025-02-26 ENCOUNTER — OFFICE VISIT (OUTPATIENT)
Dept: NEUROLOGY | Facility: CLINIC | Age: 30
End: 2025-02-26
Payer: MEDICAID

## 2025-02-26 VITALS
HEIGHT: 64 IN | DIASTOLIC BLOOD PRESSURE: 80 MMHG | BODY MASS INDEX: 27.66 KG/M2 | WEIGHT: 162 LBS | SYSTOLIC BLOOD PRESSURE: 120 MMHG | RESPIRATION RATE: 16 BRPM

## 2025-02-26 DIAGNOSIS — G43.019 INTRACTABLE MIGRAINE WITHOUT AURA AND WITHOUT STATUS MIGRAINOSUS: Primary | ICD-10-CM

## 2025-02-26 DIAGNOSIS — R05.3 PERSISTENT DRY COUGH: ICD-10-CM

## 2025-02-26 DIAGNOSIS — Z77.120 MOLD EXPOSURE: ICD-10-CM

## 2025-02-26 DIAGNOSIS — M62.838 TRAPEZIUS MUSCLE SPASM: ICD-10-CM

## 2025-02-26 PROCEDURE — 99204 OFFICE O/P NEW MOD 45 MIN: CPT | Performed by: OTHER

## 2025-02-26 RX ORDER — RIZATRIPTAN BENZOATE 10 MG/1
TABLET ORAL
Qty: 12 TABLET | Refills: 0 | Status: SHIPPED | OUTPATIENT
Start: 2025-02-26

## 2025-02-26 NOTE — PROGRESS NOTES
Patient stated she has been having chronic migraines  For 3 years she stated she thinks it could be because she hasn't got her wisdom teeth removed. She stated she takes a medication OTC but it still takes hours to help.
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
## 2025-02-26 NOTE — PATIENT INSTRUCTIONS
Preventive: Magnesium and/or Riboflavin  Rescue: Rizatriptan at first sign of migraine  Physical therapy for your neck/trapezii  Other options to consider: trigger point injections    Dry cough: referrals to allergy; GI; integrative medicine    HEADACHE / MIGRAINE LIFESTYLE INFORMATION     Headache Preventive Treatment:   Please keep in mind that it takes 4-6 weeks for the medication to start working well and 2-3 months at the appropriate dose before deciding if it will be useful or not. If it is not helping at all by this time, then we will discuss other medications to try. Supplements may take 3-6 months until you see full effect.     Natural supplements:  Magnesium 400 mg at bedtime    Magnesium oxide is good for constipation; otherwise you can try magnesium glycinate   Vitamin B2- 400mg in the morning     Vitamins and herbs that show potential    Magnesium: Magnesium (400 mg at bedtime) has a relaxant effect on smooth muscles such as blood vessels. Individuals suffering from frequent or daily headache usually have low magnesium levels which can be increase with daily supplementation of 400-750 mg. Three trials found 40-90% average headache reduction  when used as a preventative. Magnesium also demonstrated the benefit in menstrually related migraine.  Magnesium is part of the messenger system in the serotonin cascade and it is a good muscle relaxant.  It is also useful for constipation which can be a side effect of other medications used to treat migraine. Good sources include nuts, whole grains, and tomatoes.    Riboflavin (vitamin B 2) 400 mg in the morning. This vitamin assists nerve cells in the production of ATP a principal energy storing molecule.  It is necessary for many chemical reactions in the body.  There have been at least 3 clinical trials of riboflavin using 400 mg per day all of which suggested that migraine frequency can be decreased.  All 3 trials showed significant improvement in over half of  migraine sufferers.  The supplement is found in bread, cereal, milk, meat, and poultry.  Most Americans get more riboflavin than the recommended daily allowance, however riboflavin deficiency is not necessary for the supplements to help prevent headache.    Melatonin: Increasing evidence shows correlation between melatonin secretion and headache conditions.  Melatonin supplementation has decreased headache intensity and duration.  It is widely used as a sleep aid.  Sleep is natures way of dealing with migraine.  A dose of 3 mg is recommended to start for headaches including cluster headache. Higher doses up to 15 mg has been reviewed for use in Cluster headache and have been used.  The rationale behind using melatonin for cluster is that many theories regarding the cause of Cluster headache center around the disruption of the normal circadian rhythm in the brain.  This helps restore the normal circadian rhythm.    Becky: Becky has a small amount of antihistamine and anti-inflammatory action which may help headache.  It is primarily used for nausea and may aid in the absorption of other medications.    HEADACHE DIET: Foods and beverages which may trigger migraine  Note that only 20% of headache patients are food sensitive. You will know if you are food sensitive if you get a headache consistently 20 minutes to 2 hours after eating a certain food. Only cut out a food if it causes headaches, otherwise you might remove foods you enjoy! What matters most for diet is to eat a well balanced healthy diet full of vegetables and low fat protein, and to not miss meals.    Chocolate, other sweets    ALL cheeses except cottage and cream cheese    Dairy products, yogurt, sour cream, ice cream    Liver    Meat extracts (Bovril, Marmite, meat tenderizers)    Meats or fish which have undergone aging, fermenting, pickling or smoking. These include: Hotdogs,salami,Lox,sausage,  mortadellas,smoked salmon, pepperoni, Pickled  herring    Pods of broad bean (English beans, Chinese pea pods, Italian (christiano) beans, lima and navy beans    Ripe avocado, ripe banana    Yeast extracts or active yeast preparations such as Nicolas's or Stacey's (commercial bakes goods are permitted)    Tomato based foods, pizza (lasagna, etc.)    MSG (monosodium glutamate) is disguised as many things; look for these common aliases:  Monopotassium glutamate  Autolysed yeast  Hydrolysed protein  Sodium caseinate  “flavorings”  “all natural preservatives\"    Nutrasweet    Avoid all other foods that convincingly provoke headaches.    Headache Prevention Strategies:    1. Maintain a headache diary; learn to identify and avoid triggers. Common triggers include:    Emotional triggers:  Emotional/Upset family or friends  Emotional/Upset occupation  Business reversal/success  Anticipation anxiety  Crisis-serious  Post-crisis periodNew job/position     Physical triggers:  Vacation Day  Weekend  Strenuous Exercise  High Altitude Location  New Move  Menstrual Day  Physical Illness  Oversleep/Not enough sleep  Weather changes  Light: Photophobia or light sesnitivity treatment involves a balance between desensitization and reduction in overly strong input. Use dark polarized glasses outside, but not inside. Avoid bright or fluorescent light, but do not dim environment to the point that going into a normally lit room hurts. Consider FL-41 tint lenses, which reduce the most irritating wavelengths without blocking too much light.  These can be obtained at [facility] [employer] or [employer]  Foods: see list above.    2. Limit use of acute treatments (over-the-counter medications, triptans, etc.) to no more than 2 days per week or 10 days per month to prevent medication overuse headache (rebound headache).      3. Follow a regular schedule (including weekends and holidays):  Don't skip meals. Eat a balanced diet.  8 hours of sleep nightly.  Minimize stress.  Exercise 30 minutes per  day.   Keep well hydrated and drink 6-8 glasses of water per day.    4. Initiate non-pharmacologic measures at the earliest onset of your headache.  Rest and quiet environment.  Relax and reduce stress.   Cold compresses.    5. Don't wait!! Take the maximum allowable dosage of prescribed medication at the first sign of migraine.    6. Compliance:  Take prescribed medication regularly as directed and at the first sign of a migraine.    7. Communicate:  Call your physician when problems arise, especially if your headaches change, increase in frequency/severity, or become associated with neurological symptoms (weakness, numbness, slurred speech, etc.).    8. Headache/pain management therapies: Consider various complementary methods, including medication, behavioral therapy, psychological counselling, biofeedback, massage therapy, acupuncture, dry needling, and other modalities.  Such measures may reduce the need for medications. Counseling for pain management, where patients learn to function and ignore/minimize their pain, seems to work very well.    9. Recommend changing family's attention and focus away from patient's headaches. Instead, emphasize daily activities. If first question of day is 'How are your headaches/Do you have a headache today?', then patient will constantly think about headaches, thus making them worse. Goal is to re-direct attention away from headaches, toward daily activities and other distractions.    10. Helpful Websites:  www.AmericanHeadacheSociety.org  www.migrainetrust.org  www.headaches.org  www.migraine.org.uk  www.achenet.org    11. HEADACHE EXPECTATIONS:  There are many types of headaches, and only a rare few in which complete relief can be expected.  In general, there is no cure for headache, especially migraine based headaches.  There is nothing available that completely prevents headaches from occurring, breaking through, or having periodic flare-ups and fluctuations.  Regardless of  what you are using on a daily basis for prevention, episodic headaches should still be expected, and periods where frequency may escalate and fluctuate are unavoidable.     There is no quick fix for most headaches.  Furthermore, the longer you have had high frequency headaches (such as chronic daily headache), the longer it will likely take to expect any improvement.  In fact, some people will never improve, regardless of how many medications or other treatments we try.  Our treatment strategy is to evaluate for possible causes of your headache, although testing is usually always normal, even in cases of daily continuous headaches for years.  Most types of headache such as migraine are electrical brain disorders (similar to how epilepsy is an electrical brain disorders).  Therefore, there is no testing that will reveal this \"dysfunctional electrical circuitry\" such on MRI, or other testing.  We try to find a medication that may help lessen the frequency and/or severity of your headaches.  The goal is not to completely stop them from happening, although if that happens, great!  Different people respond to different medications, and some people just don't respond to anything, so it's usually a matter of trying different options.  We can not predict if or when exactly you will respond to a treatment that we provide.     Preventive headache medications take 4-6 weeks to start working, and 2-3 months to see full effect, assuming you reach an effective dose.  Our recommendation will generally be to give it adequate time first.  If you are unable to wait it out for medications to work, we can also try IV infusions for some temporary relief.       In general, the best that preventive medications or other treatments (including Botox) are able to offer in migraine management (variable in other headache types) is a 50% improvement in frequency and/or severity of headache.  That is our goal, and any additional benefit is  considered a bonus.  Some people do significantly better than this, others do not get close to this.  Therefore, if your headaches are not improving by at least 3 months on your preventive strategy, contact us and we can discuss further adjustments.  Keep in mind that complete headache cure is not a realistic expectation.    Our Team:  The nursing staff, and medical assistants are a major part of your treatment team and will be handling your phone calls and inquiries, if any.   Unless explicitly told otherwise at the time of your office visit, your study results and ensuing treatment plans will be released via [employer] and discussed during your follow-up appointment.     MyChart: Please ask the schedulers to give you an activation code. The main way of communication is by [employer] rather than phone lines, so if you have not signed up, please do so. [facility] is also the way that you can review your labs and testing.  If you have any questions about the results, you are free to message us.     [employer] is meant for simple questions regarding medications, possible side effects, or other simple straight forward questions in limited sentences, rather than multiple paragraphs of discussion.  [employer] is not meant for, or efficient for these complex questions, extensive questions, extensive medication adjustments, complex new symptoms or concerns.  These issues beyond simple questions require a follow up visit with myself,    Refills:  Please pay attention to when your refills will need to be renewed. Due to the volume of phone calls daily, this could potentially take a few days, although we certainly try to honor your refill requests as soon as we can.  You should call at least 1 week in advance of needing a refill to ensure you do not run out of medication.  Keep in mind that refill requests on Fridays may not be filled until the following week.       Refill policies:    Allow 2-3 business days for refills; controlled  substances may take longer.  Contact your pharmacy at least 5 days prior to running out of medication and have them send an electronic request or submit request through the “request refill” option in your [facility] account.  Refills are not addressed on weekends; covering physicians do not authorize routine medications on weekends.  No narcotics or controlled substances are refilled after noon on Fridays or by on call physicians.  By law, narcotics must be electronically prescribed.  A 30 day supply with no refills is the maximum allowed.  If your prescription is due for a refill, you may be due for a follow up appointment.  To best provide you care, patients receiving routine medications need to be seen at least once a year.  Patients receiving narcotic/controlled substance medications need to be seen at least once every 3 months.  In the event that your preferred pharmacy does not have the requested medication in stock (e.g. Backordered), it is your responsibility to find another pharmacy that has the requested medication available.  We will gladly send a new prescription to that pharmacy at your request.    Scheduling Tests:    If your physician has ordered radiology tests such as MRI or CT scans, please contact Central Scheduling at [phone] right away to schedule the test.  Once scheduled, the [facility] Centralized Referral Team will work with your insurance carrier to obtain pre-certification or prior authorization.  Depending on your insurance carrier, approval may take 3-10 days.  It is highly recommended patients assure they have received an authorization before having a test performed.  If test is done without insurance authorization, patient may be responsible for the entire amount billed.      Precertification and Prior Authorizations:  If your physician has recommended that you have a procedure or additional testing performed the [facility] Centralized Referral Team will contact your insurance carrier to obtain  pre-certification or prior authorization.    You are strongly encouraged to contact your insurance carrier to verify that your procedure/test has been approved and is a COVERED benefit.  Although the [facility] Centralized Referral Team does its due diligence, the insurance carrier gives the disclaimer that \"Although the procedure is authorized, this does not guarantee payment.\"    Ultimately the patient is responsible for payment.   Thank you for your understanding in this matter.  Paperwork Completion:  If you require FMLA or disability paperwork for your recovery, please make sure to either drop it off or have it faxed to our office at [phone]. Be sure the form has your name and date of birth on it.  The form will be faxed to our Forms Department and they will complete it for you.  There is a 25$ fee for all forms that need to be filled out.  Please be aware there is a 10-14 day turnaround time.  You will need to sign a release of information (SRIDHAR) form if your paperwork does not come with one.  You may call the Forms Department with any questions at [phone].  Their fax number is [phone].

## 2025-02-28 ENCOUNTER — OFFICE VISIT (OUTPATIENT)
Dept: FAMILY MEDICINE CLINIC | Facility: CLINIC | Age: 30
End: 2025-02-28
Payer: MEDICAID

## 2025-02-28 VITALS
DIASTOLIC BLOOD PRESSURE: 80 MMHG | SYSTOLIC BLOOD PRESSURE: 132 MMHG | OXYGEN SATURATION: 97 % | HEART RATE: 100 BPM | WEIGHT: 159 LBS | HEIGHT: 64 IN | RESPIRATION RATE: 16 BRPM | TEMPERATURE: 99 F | BODY MASS INDEX: 27.14 KG/M2

## 2025-02-28 DIAGNOSIS — J02.0 STREP THROAT: Primary | ICD-10-CM

## 2025-02-28 DIAGNOSIS — J11.1 INFLUENZA-LIKE ILLNESS: ICD-10-CM

## 2025-02-28 LAB
CONTROL LINE PRESENT WITH A CLEAR BACKGROUND (YES/NO): YES YES/NO
KIT LOT #: NORMAL NUMERIC
STREP GRP A CUL-SCR: POSITIVE

## 2025-02-28 PROCEDURE — 87637 SARSCOV2&INF A&B&RSV AMP PRB: CPT | Performed by: NURSE PRACTITIONER

## 2025-02-28 PROCEDURE — 87880 STREP A ASSAY W/OPTIC: CPT | Performed by: NURSE PRACTITIONER

## 2025-02-28 PROCEDURE — 99213 OFFICE O/P EST LOW 20 MIN: CPT | Performed by: NURSE PRACTITIONER

## 2025-02-28 RX ORDER — AMOXICILLIN 500 MG/1
500 CAPSULE ORAL 2 TIMES DAILY
Qty: 20 CAPSULE | Refills: 0 | Status: SHIPPED | OUTPATIENT
Start: 2025-02-28 | End: 2025-03-10

## 2025-03-01 LAB
FLUAV + FLUBV RNA SPEC NAA+PROBE: DETECTED
FLUAV + FLUBV RNA SPEC NAA+PROBE: NOT DETECTED
RSV RNA SPEC NAA+PROBE: NOT DETECTED
SARS-COV-2 RNA RESP QL NAA+PROBE: NOT DETECTED

## 2025-03-01 NOTE — PROGRESS NOTES
CHIEF COMPLAINT:     Chief Complaint   Patient presents with   • Cough     Sx Wednesday - ST  Sx yesterday - Productive cough, chest congestion, chest tightness fatigue, tactile fever, chills, body aches, PND, intermittent lightheaded  Denies sinus pressure, ear pain/pressure, nasal congestion, runny nose, n/v/d, loss of appetite  No Covid test was done at home  OTC Mucinex FastMax, antihistamine, Tylenol, and Mucinex DM       HPI:   Naomi Martinez is a 29 year old female who presents for sudden onset of flu-like symptoms. Symptoms began 2 days ago.  Patient reports body aches, chills,  sore throat. Associated symptoms include sinus pressure, feverish, fatigue, coughing hurts throat.  Symptoms worsened yesterday.  Treating symptoms with mucinex-dm, tylenol xs, antihistamines.   + influenza vaccination.  Many co-workers are sick.   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    Current Outpatient Medications   Medication Sig Dispense Refill   • azelastine 137 MCG/SPRAY Nasal Solution INSTILL 2 SPRAYS IN EACH NOSTRIL NASALLY TWICE A DAY AS NEEDED 30 DAY(S) for 90     • albuterol 108 (90 Base) MCG/ACT Inhalation Aero Soln Inhale 2 puffs into the lungs every 4 (four) hours as needed for Wheezing or Shortness of Breath. 1 each 0   • cholecalciferol 50 MCG (2000 UT) Oral Cap Take by mouth.     • albuterol 108 (90 Base) MCG/ACT Inhalation Aero Soln Inhale 2 puffs into the lungs every 6 (six) hours as needed for Wheezing. 18 g 3   • Rizatriptan Benzoate 10 MG Oral Tab use at onset; may repeat once after 2 hours- ONLY 2 IN 24 HOUR PERIOD MAX.  This is a 30 day supply. (Patient not taking: Reported on 2/28/2025) 12 tablet 0      Past Medical History:   • Asthma (HCC)   • Deviated nasal septum   • GERD (gastroesophageal reflux disease)   • Hypertrophy of nasal turbinates   • Seasonal allergies      History reviewed. No pertinent surgical history.      Social History     Socioeconomic History   • Marital status:  Single   Tobacco Use   • Smoking status: Never     Passive exposure: Never   • Smokeless tobacco: Never   Vaping Use   • Vaping status: Former   • Substances: Nicotine, Flavoring   Substance and Sexual Activity   • Alcohol use: Yes     Comment: Socially - 2 drinks once a month   • Drug use: Never   • Sexual activity: Yes     Partners: Male   Other Topics Concern   • Caffeine Concern Yes     Comment: tea   • Exercise No   Social History Narrative    Relationships: Single    Children: None    Pets: 2 Cats    School: Radiology program     Work: Works with animals - reception and tech    Origin: From [place] [facility] [place]. Mostly [place] background.     Interests: Enjoys walking, nature, horseback riding, video game - PS4    Spiritual: Anabaptist - not practicing.          REVIEW OF SYSTEMS:   GENERAL: feels chilled, feverish.   SKIN: no rashes or abnormal skin lesions  HEENT: See HPI  LUNGS: denies shortness of breath or wheezing, See HPI  CARDIOVASCULAR: denies chest pain or palpitations   GI: denies abdominal pain      EXAM:   /80   Pulse 100   Temp 99.2 °F (37.3 °C) (Tympanic)   Resp 16   Ht 5' 4\" (1.626 m)   Wt 159 lb (72.1 kg)   LMP 02/13/2025 (Exact Date)   SpO2 97%   BMI 27.29 kg/m²     Physical Exam  Vitals reviewed.   Constitutional:       General: She is not in acute distress.     Appearance: Normal appearance. She is not ill-appearing.   HENT:      Head: Normocephalic and atraumatic.      Right Ear: Tympanic membrane and ear canal normal.      Left Ear: Tympanic membrane and ear canal normal.      Nose: Congestion and rhinorrhea present. Rhinorrhea is clear.      Mouth/Throat:      Mouth: Mucous membranes are moist.      Pharynx: Oropharynx is clear. Uvula midline. Posterior oropharyngeal erythema present.      Tonsils: No tonsillar exudate. 1+ on the right. 1+ on the left.   Eyes:      Extraocular Movements: Extraocular movements intact.      Conjunctiva/sclera: Conjunctivae normal.   Cardiovascular:       Rate and Rhythm: Normal rate and regular rhythm.      Heart sounds: Normal heart sounds. No murmur heard.  Pulmonary:      Effort: Pulmonary effort is normal.      Breath sounds: Normal breath sounds and air entry.   Abdominal:      General: Bowel sounds are normal.      Palpations: Abdomen is soft.      Tenderness: There is no abdominal tenderness.   Musculoskeletal:      Cervical back: Normal range of motion and neck supple.   Lymphadenopathy:      Cervical: Cervical adenopathy present.      Right cervical: Superficial cervical adenopathy present. No posterior cervical adenopathy.     Left cervical: Superficial cervical adenopathy present. No posterior cervical adenopathy.   Skin:     General: Skin is warm and dry.      Findings: No rash.   Neurological:      General: No focal deficit present.      Mental Status: She is alert.   Psychiatric:         Speech: Speech normal.         Behavior: Behavior normal. Behavior is cooperative.       Recent Results (from the past 24 hours)   Strep A Assay W/Optic    Collection Time: 02/28/25  7:42 PM   Result Value Ref Range    Strep Grp A Screen Positive Negative    Control Line Present with a clear background (yes/no) Yes Yes/No    Kit Lot # [id number] Numeric    Kit Expiration Date 12/20/2025 Date         ASSESSMENT AND PLAN:   Naomi Martinez is a 29 year old female who presents with upper respiratory symptoms that are consistent with    ASSESSMENT:   Encounter Diagnoses   Name Primary?   • Strep throat Yes   • Influenza-like illness        PLAN:    Will send quad.  Reviewed symptom relief measures.  Comfort care as described in Patient Instructions  Medication below.    Meds & Refills for this Visit:  Requested Prescriptions     Signed Prescriptions Disp Refills   • amoxicillin 500 MG Oral Cap 20 capsule 0     Sig: Take 1 capsule (500 mg total) by mouth 2 (two) times daily for 10 days.       Risks, benefits, and side effects of medication explained and  discussed.    The patient indicates understanding of these issues and agrees to the plan.  The patient is asked to return if sx's persist or worsen.    Patient Instructions   It is recommended to take probiotics while taking an antibiotic.    Examples include:  Yogurt - eat 4-8 oz twice daily.  Choose a product with the National Yogurt Association's seal such as Dannon, Yoplait, or Activia.   Capsule/granule forms such as Florastor, Florajen (refrigerated), or Culturelle.      Take the probiotic at least 3-4 hours after taking the antibiotic.  Continue the probiotic for 1-2 weeks after completing the antibiotic.      Comfort measures explained and discussed:    OTC Tylenol/ibuprofen as needed.    Push fluids- warm or cool liquids, whichever is soothing for patient.     Avoid caffeine.    Do not share utensils or drinks with anyone.    Good handwashing.    Get plenty of rest.    Can use over the counter benzocaine such as Cepacol throat lozenges or Chloroseptic throat spray to soothe sore throat.    Warm salt water gargles 2-3 times daily for at least 3 days.      If strep test is results are positive:    Take the full course of your antibiotic even if you are feeling better.   You are considered to be contagious until you have been on antibiotics for 24 hours.   You can return to school and/or work once on antibiotics for 24 hours  Change tooth brush two days into therapy  Follow up in 3-5 days if not improving, condition worsens, or fever greater than or equal to 100.4 persists for 72 hours.  Follow up in 3-5 days if not improving, condition worsens, or fever greater than or equal to 100.4 persists for 72 hours.

## 2025-07-15 ENCOUNTER — OFFICE VISIT (OUTPATIENT)
Dept: INTERNAL MEDICINE CLINIC | Facility: CLINIC | Age: 30
End: 2025-07-15
Payer: MEDICAID

## 2025-07-15 VITALS
TEMPERATURE: 98 F | DIASTOLIC BLOOD PRESSURE: 76 MMHG | HEIGHT: 64 IN | BODY MASS INDEX: 26.98 KG/M2 | SYSTOLIC BLOOD PRESSURE: 128 MMHG | OXYGEN SATURATION: 98 % | WEIGHT: 158 LBS | HEART RATE: 98 BPM

## 2025-07-15 DIAGNOSIS — G44.89 OTHER HEADACHE SYNDROME: ICD-10-CM

## 2025-07-15 DIAGNOSIS — J30.2 SEASONAL ALLERGIES: ICD-10-CM

## 2025-07-15 DIAGNOSIS — J34.2 DEVIATED NASAL SEPTUM: ICD-10-CM

## 2025-07-15 DIAGNOSIS — J45.990 EXERCISE-INDUCED BRONCHOSPASM (HCC): ICD-10-CM

## 2025-07-15 DIAGNOSIS — J35.1 ENLARGED TONSILS: ICD-10-CM

## 2025-07-15 DIAGNOSIS — E66.3 OVERWEIGHT (BMI 25.0-29.9): ICD-10-CM

## 2025-07-15 DIAGNOSIS — K21.9 GASTROESOPHAGEAL REFLUX DISEASE, UNSPECIFIED WHETHER ESOPHAGITIS PRESENT: ICD-10-CM

## 2025-07-15 DIAGNOSIS — J34.3 HYPERTROPHY OF NASAL TURBINATES: ICD-10-CM

## 2025-07-15 DIAGNOSIS — Z00.00 HEALTH MAINTENANCE EXAMINATION: Primary | ICD-10-CM

## 2025-07-15 PROBLEM — Z23 NEED FOR HEPATITIS B VACCINATION: Status: RESOLVED | Noted: 2024-07-24 | Resolved: 2025-07-15

## 2025-07-15 PROCEDURE — 99213 OFFICE O/P EST LOW 20 MIN: CPT | Performed by: FAMILY MEDICINE

## 2025-07-15 NOTE — ASSESSMENT & PLAN NOTE
Patient notes a history of enlarged tonsils.  2+ tonsils seen on examination today  She notes that she has seen ENT in the past and have gone back and forth about tonsillectomy.  Consider ENT evaluation in the future if she desires

## 2025-07-15 NOTE — ASSESSMENT & PLAN NOTE
Patient with a history of frequent headaches.  Possible migraines  No common pattern for headaches.   Rizatriptan was not helpful  Following with neurology - advise close follow up

## 2025-07-15 NOTE — PROGRESS NOTES
FAMILY MEDICINE CLINIC NOTE    HPI  Naomi Martinez is a 30 year old female presenting for physical    #Health Maintenance  -Diet: Mix of healthy and unhealthy. 2-3 meals a day. Occasional fast food  -Exercise: Limited - 10k steps if possible   -Lung cancer screen: Not indicated  -Colon cancer screen: Not indicated  -Statin:  - 8/2023 lipid panel  -ASA: Not indicated  -Breast cancer screen: Not indicated  -Breast cancer medication to reduce risk: Declines   -Periods: LMP 1 month. Periods are regular.  -Cervical cancer screen: - 7/29/22 normal pap. Due for another pap smear.  -DEXA: Not indicated  -BRCA: Not indicated  -Intimate partner violence: Denies abuse  -HIV screen: 7/2024 negative  -Hep C screen: 7/2024 negative   -Gonorrhea/chlamydia:  Not Indicated  -Syphillis: Not indicated  -TB: Not indicated  -Tobacco/alcohol: Per below  -Depression: PHQ-2 score of 1 (score >/= 3 do PHQ-9)  -Advanced Directive: Indicated     #Immunizations  -Tdap: 11/2017  -Flu shot: Not indicated - not season  -PCV13: Not indicated   -PCV20: Not indicated   -PPSV23: Not indicated   -HPV: Received  -RZV (preferred) or VZL: Not indicated   -RSV: Not indicated   -COVID: Indicated      #HA  -every 2-3 days with headaches  -takes generic excedrin - helps  -usually one sided  -wonders if related to wisdom teeth - removed, but not helpful  -no nausea  -photophobia - dark room  -loud sounds also bothersome  -headaches can last hours  -no history of aura  -no known triggers  -no headache currently   -now seeing neurology Dr Elsa Starr  -rizatriptan as needed - not helpful        #Deviated septum  #Hypertrophy of nasal terbinates  -historically has seen ENT   -monitor symptomatically for now     #Exercise induced bronchospasm   -uses albuterol as needed      #GERD  -intermittent acid reflux  -not on medication       #Patient Care Team  Patient Care Team:  Cody Saldana MD as PCP - General (Family Medicine)    ROS  GENERAL: No fever/chills,  no recent weight loss   HEENT: No visual changes, no changes in hearing, no sore throats  NECK: No pain, no swelling  RESP: No cough, no SOB  CV: No chest pain, no palpitations  GI: No abd pain, no N/V/D  MSK: No edema, no pain  SKIN: No new rashes  NEURO: No numbness, no tingling, no HA    HEALTH MAINTENANCE CHECKLIST  Health Maintenance Topics with due status: Overdue       Topic Date Due    COVID-19 Vaccine 09/01/2024    Annual Depression Screening 01/01/2025     Health Maintenance Topics with due status: Due Soon       Topic Date Due    Annual Physical 07/22/2025    Pap Smear 07/29/2025       ALLERGIES  Allergies[1]    MEDICATIONS  Current Medications[2]    ACTIVE PROBLEM  Problem List[3]    PAST MEDICAL HISTORY  Past Medical History[4]    PAST SOCIAL HISTORY  Social History     Socioeconomic History    Marital status: Single     Spouse name: Not on file    Number of children: Not on file    Years of education: Not on file    Highest education level: Not on file   Occupational History    Not on file   Tobacco Use    Smoking status: Never     Passive exposure: Never    Smokeless tobacco: Never   Vaping Use    Vaping status: Former    Substances: Nicotine, Flavoring   Substance and Sexual Activity    Alcohol use: Yes     Comment: Socially - 2 drinks once a month    Drug use: Never    Sexual activity: Not Currently     Partners: Male   Other Topics Concern     Service Not Asked    Blood Transfusions Not Asked    Caffeine Concern Yes     Comment: tea    Occupational Exposure Not Asked    Hobby Hazards Not Asked    Sleep Concern Not Asked    Stress Concern Not Asked    Weight Concern Not Asked    Special Diet Not Asked    Back Care Not Asked    Exercise No    Bike Helmet Not Asked    Seat Belt Not Asked    Self-Exams Not Asked   Social History Narrative    Relationships: Single    Children: None    Pets: 2 Cats    School: Going to pharmaceutical school at [employer].    Work: Works with animals - reception and  tech    Origin: From [facility]. Mostly [place] background.     Interests: Enjoys walking, nature, horseback riding, video game - PS4    Spiritual: Spiritism - not practicing.      Social Drivers of Health     Food Insecurity: Not on file   Transportation Needs: Not on file   Stress: Not on file   Housing Stability: Not on file       PAST SURGICAL HISTORY  Past Surgical History[5]    PAST FAMILY HISTORY  Family History[6]    PHYSICAL EXAM  Vitals:    07/15/25 1044   BP: 128/76   Pulse: 98   Temp: 97.9 °F (36.6 °C)   SpO2: 98%   Weight: 158 lb (71.7 kg)   Height: 5' 4\" (1.626 m)      Body mass index is 27.12 kg/m².    GENERAL: NAD  HEENT: Moist mucous membranes, 2+ tonsils without erythema or exudate, PERRLA bilat, TM translucent and non-bulging  NECK: Supple, non-tender  RESP: CTAB, no wheezing, no rales, no rhonchi  CV: RRR, no murmurs  GI: Soft, non-distended, non-tender, no guarding, no rebound, no masses  MSK: No edema  SKIN: Warm and dry, no rashes  NEURO: Answering questions appropriately    LABS  Lab Results   Component Value Date    WBC 10.5 01/04/2024    HGB 13.1 01/04/2024    HCT 40.5 01/04/2024    .0 01/04/2024    NEPERCENT 71.8 01/04/2024    LYPERCENT 16.5 01/04/2024    MOPERCENT 8.1 01/04/2024    EOPERCENT 2.7 01/04/2024    BAPERCENT 0.6 01/04/2024    NE 7.56 01/04/2024    LYMABS 1.74 01/04/2024    MOABSO 0.85 01/04/2024    EOABSO 0.28 01/04/2024    BAABSO 0.06 01/04/2024       Lab Results   Component Value Date     01/04/2024    K 3.8 01/04/2024     01/04/2024    CO2 26.0 01/04/2024    ANIONGAP 6 01/04/2024    BUN 6 (L) 01/04/2024    CREATSERUM 0.78 01/04/2024    BUNCREA 7.7 (L) 01/04/2024    GLU 85 01/04/2024    CA 9.5 01/04/2024    OSMOCALC 285 01/04/2024    ALT 10 01/04/2024    AST 14 01/04/2024    ALKPHO 66 01/04/2024    BILT 0.3 01/04/2024    TP 8.0 01/04/2024    ALB 4.6 01/04/2024    GLOBULIN 3.4 01/04/2024    ELECTAG 1.4 01/04/2024    FASTING Yes 08/29/2023    FASTING Yes  08/29/2023         Lab Results   Component Value Date    CHOLEST 145 08/29/2023    TRIG 73 08/29/2023    HDL 46 08/29/2023    LDL 85 08/29/2023    VLDL 12 08/29/2023    NONHDLC 99 08/29/2023        DIAGNOSTICS    ASSESSMENT/PLAN  Problem List Items Addressed This Visit          Pulmonary and Pneumonias    Exercise-induced bronchospasm (HCC)    Patient with a history of exercise-induced bronchospasm.  Has had PFTs done in the past which were normal.  Uses albuterol as needed            Endocrine and Metabolic    Overweight (BMI 25.0-29.9)    Diet and exercise            Neuro    Other headache syndrome    Patient with a history of frequent headaches.  Possible migraines  No common pattern for headaches.   Rizatriptan was not helpful  Following with neurology - advise close follow up            Gastrointestinal and Abdominal    GERD (gastroesophageal reflux disease)    Lifestyle modifications discussed previously  Overall well managed at this time            EarNoseThroat    Deviated nasal septum    Patient with a history of deviated septum and hypertrophy of nasal turbinates  Can follow with ENT again in the future if she desires.         Enlarged tonsils    Patient notes a history of enlarged tonsils.  2+ tonsils seen on examination today  She notes that she has seen ENT in the past and have gone back and forth about tonsillectomy.  Consider ENT evaluation in the future if she desires         Hypertrophy of nasal turbinates    Patient with a history of deviated septum and hypertrophy of nasal turbinates  Can follow with ENT again in the future if she desires.         Seasonal allergies    Can use over-the-counter allergy medication as needed.            Health Encounters    Health maintenance examination - Primary    Exercise and diet advised.  School form completed in office today  If needing labs can notify me   COVID vaccine advised.  Advanced directive information provided.  Pap smear - see OBGYN          Relevant Orders    [facility] - INTERNAL       Return in about 1 year (around 7/15/2026) for physical.    Cody Saldana MD  Family Medicine         [1]   Allergies  Allergen Reactions    Avocado DIARRHEA   [2]   Current Outpatient Medications   Medication Sig Dispense Refill    azelastine 137 MCG/SPRAY Nasal Solution INSTILL 2 SPRAYS IN EACH NOSTRIL NASALLY TWICE A DAY AS NEEDED 30 DAY(S) for 90      albuterol 108 (90 Base) MCG/ACT Inhalation Aero Soln Inhale 2 puffs into the lungs every 4 (four) hours as needed for Wheezing or Shortness of Breath. 1 each 0    cholecalciferol 50 MCG (2000 UT) Oral Cap Take by mouth.      albuterol 108 (90 Base) MCG/ACT Inhalation Aero Soln Inhale 2 puffs into the lungs every 6 (six) hours as needed for Wheezing. 18 g 3   [3]   Patient Active Problem List  Diagnosis    Hypertrophy of nasal turbinates    Exercise-induced bronchospasm (HCC)    Deviated nasal septum    Seasonal allergies    Enlarged tonsils    GERD (gastroesophageal reflux disease)    Other headache syndrome    Health maintenance examination    Overweight (BMI 25.0-29.9)   [4]   Past Medical History:   Asthma (HCC)    Deviated nasal septum    GERD (gastroesophageal reflux disease)    Hypertrophy of nasal turbinates    Seasonal allergies   [5] History reviewed. No pertinent surgical history.  [6]   Family History  Problem Relation Age of Onset    Other (Rheumatoid arthritis) Mother     No Known Problems Father         Not in contact    Heart Disorder Maternal Grandmother         Heart failure    Diabetes Maternal Grandmother     No Known Problems Maternal Grandfather     No Known Problems Paternal Grandmother     No Known Problems Paternal Grandfather     Breast Cancer Maternal Aunt     Other (Fibroadenoma) Half-Sister         Breast    Colon Cancer Neg     Crohn's Disease Neg     Ulcerative Colitis Neg

## 2025-07-15 NOTE — ASSESSMENT & PLAN NOTE
Exercise and diet advised.  School form completed in office today  If needing labs can notify me   COVID vaccine advised.  Advanced directive information provided.  Pap smear - see OBGYN

## 2025-07-15 NOTE — PATIENT INSTRUCTIONS
PATIENT INSTRUCTIONS    Thank you for seeing me today, it was a pleasure taking care of you.  Please check out at the  and schedule a follow up appointment.  Return in about 1 year (around 7/15/2026) for physical.  Please remember that the preferred jennifer period for appointments is 5 minutes. This is to help maximize the amount of time that we can spend together at our visits.    The following imaging studies were ordered: None  Please also follow up with the following specialists: OBGYN, neurology  Please fill out the advance directive information (power of  documents) and bring a copy to our clinic.  Diet and exercise    Dr. Les Turk

## 2025-08-23 ENCOUNTER — PATIENT MESSAGE (OUTPATIENT)
Dept: INTERNAL MEDICINE CLINIC | Facility: CLINIC | Age: 30
End: 2025-08-23

## 2025-08-23 DIAGNOSIS — L98.9 SKIN LESION: Primary | ICD-10-CM

## (undated) NOTE — LETTER
24      Patient: Naomi Martinez  : 1995 Visit date: 2024    Dear Cody,      I examined your patient in consultation today.    She is 7 weeks post healed right hand cat bite.  She presents with pain and residual stiffness.    We have started her in therapy including range of motion, ultrasound, and other modalities.    Thank you for your kind referral. If I may answer any questions, please feel free to contact me.     Sincerely,   Emory Carpenter MD     CC:   No Recipients

## (undated) NOTE — LETTER
Date & Time: 1/5/2024, 5:57 PM  Patient: Naomi Martinez  Encounter Provider(s):    Law Casillas MD       To Whom It May Concern:    Naomi Martinez was seen and treated in our department on 1/5/2024. She can return to work 1/11/24.    If you have any questions or concerns, please do not hesitate to call.        _____________________________  Physician/APC Signature

## (undated) NOTE — LETTER
Date & Time: 8/24/2023, 9:06 PM  Patient: Yaa Diaz  Encounter Provider(s):    Edwin Nguyen MD       To Whom It May Concern:    Yaa Diaz was seen and treated in our department on 8/24/2023. She should not return to work until 8/27/23 .     If you have any questions or concerns, please do not hesitate to call.        _____________________________  Physician/APC Signature